# Patient Record
Sex: FEMALE | Race: BLACK OR AFRICAN AMERICAN | Employment: OTHER | URBAN - METROPOLITAN AREA
[De-identification: names, ages, dates, MRNs, and addresses within clinical notes are randomized per-mention and may not be internally consistent; named-entity substitution may affect disease eponyms.]

---

## 2021-08-15 ENCOUNTER — HOSPITAL ENCOUNTER (EMERGENCY)
Age: 76
Discharge: HOME | End: 2021-08-15
Attending: EMERGENCY MEDICINE
Payer: MEDICARE

## 2021-08-15 VITALS
DIASTOLIC BLOOD PRESSURE: 98 MMHG | HEART RATE: 67 BPM | TEMPERATURE: 97.3 F | RESPIRATION RATE: 14 BRPM | SYSTOLIC BLOOD PRESSURE: 149 MMHG | OXYGEN SATURATION: 99 %

## 2021-08-15 DIAGNOSIS — T14.8XXA MUSCLE STRAIN: Primary | ICD-10-CM

## 2021-08-15 PROCEDURE — 99283 EMERGENCY DEPT VISIT LOW MDM: CPT

## 2021-08-15 PROCEDURE — 6370000000 HC RX 637 (ALT 250 FOR IP): Performed by: STUDENT IN AN ORGANIZED HEALTH CARE EDUCATION/TRAINING PROGRAM

## 2021-08-15 RX ORDER — LIDOCAINE 4 G/G
1 PATCH TOPICAL DAILY
Status: DISCONTINUED | OUTPATIENT
Start: 2021-08-15 | End: 2021-08-15 | Stop reason: HOSPADM

## 2021-08-15 ASSESSMENT — PAIN DESCRIPTION - LOCATION
LOCATION: LEG
LOCATION: LEG

## 2021-08-15 ASSESSMENT — ENCOUNTER SYMPTOMS
CHEST TIGHTNESS: 0
SHORTNESS OF BREATH: 0
ABDOMINAL PAIN: 0
NAUSEA: 0
DIARRHEA: 0
VOMITING: 0

## 2021-08-15 ASSESSMENT — PAIN DESCRIPTION - PAIN TYPE
TYPE: ACUTE PAIN
TYPE: ACUTE PAIN

## 2021-08-15 ASSESSMENT — PAIN SCALES - GENERAL
PAINLEVEL_OUTOF10: 2
PAINLEVEL_OUTOF10: 8

## 2021-08-15 ASSESSMENT — PAIN - FUNCTIONAL ASSESSMENT
PAIN_FUNCTIONAL_ASSESSMENT: PREVENTS OR INTERFERES SOME ACTIVE ACTIVITIES AND ADLS
PAIN_FUNCTIONAL_ASSESSMENT: 0-10

## 2021-08-15 ASSESSMENT — PAIN DESCRIPTION - DESCRIPTORS: DESCRIPTORS: ACHING

## 2021-08-15 ASSESSMENT — PAIN DESCRIPTION - ORIENTATION
ORIENTATION: RIGHT
ORIENTATION: LEFT;UPPER

## 2021-08-15 ASSESSMENT — PAIN DESCRIPTION - PROGRESSION: CLINICAL_PROGRESSION: GRADUALLY IMPROVING

## 2021-08-15 ASSESSMENT — PAIN DESCRIPTION - ONSET: ONSET: SUDDEN

## 2021-08-15 ASSESSMENT — PAIN DESCRIPTION - FREQUENCY: FREQUENCY: CONTINUOUS

## 2021-08-15 NOTE — ED PROVIDER NOTES
[facility]          EM RESIDENT NOTE       Date of evaluation: 8/15/2021    Chief Complaint     Leg Pain (RIGHT inner thigh/groin pain since picking up heavy boxes yesterday.)      History of Present Illness     Minerva Johnson is a 68 y.o. female who presents with complaint of pain in her right inner thigh after lifting several heavy boxes. Patient states 3 days ago she was lifting a box of Ensure off of her porch when she felt a pull in her inner thigh. She says since this time she has had intermittent pain, attempted to take muscle relaxer at home that only slightly improved her pain. She was able to ambulate into the ED today without issue. She states that the pain is slightly worse when she walks, and better with rest.  She denies any other pain or injury. Review of Systems     Review of Systems   Constitutional: Negative for activity change, chills and fatigue. Respiratory: Negative for chest tightness and shortness of breath. Cardiovascular: Negative for chest pain and leg swelling. Gastrointestinal: Negative for abdominal pain, diarrhea, nausea and vomiting. Genitourinary: Negative for dysuria. Palpable mass on right inner thigh   Musculoskeletal: Negative for arthralgias. Neurological: Negative for dizziness, syncope, weakness and headaches. Past Medical, Surgical, Family, and Social History     She has a past medical history of Cancer of breast ([address] and Hypertension. She has a past surgical history that includes Appendectomy. Her family history is not on file. She reports that she is a non-smoker but has been exposed to tobacco smoke. She has never used smokeless tobacco. She reports that she does not drink alcohol and does not use drugs.     Medications     Previous Medications    FUROSEMIDE (LASIX PO)    Take by mouth    IBUPROFEN (ADVIL;MOTRIN) 600 MG TABLET    Take 1 tablet by mouth every 6 hours as needed for Pain LISINOPRIL PO    Take by mouth       Allergies     She has No Known Allergies. Physical Exam     INITIAL VITALS: BP: (!) 149/98, Temp: 97.3 °F (36.3 °C), Pulse: 67, Resp: 14, SpO2: 99 %   Physical Exam  Constitutional:       General: She is not in acute distress. Appearance: Normal appearance. She is normal weight. She is not ill-appearing. HENT:      Head: Normocephalic and atraumatic. Eyes:      Extraocular Movements: Extraocular movements intact. Conjunctiva/sclera: Conjunctivae normal.      Pupils: Pupils are equal, round, and reactive to light. Cardiovascular:      Rate and Rhythm: Normal rate and regular rhythm. Pulses: Normal pulses. Heart sounds: Normal heart sounds. Pulmonary:      Effort: Pulmonary effort is normal. No respiratory distress. Breath sounds: Normal breath sounds. Abdominal:      General: Abdomen is flat. Palpations: Abdomen is soft. Musculoskeletal:         General: No swelling. Normal range of motion. Cervical back: Normal range of motion and neck supple. Comments: Palpable mass in right inner thigh medial to femoral vessels. Skin:     General: Skin is warm and dry. Capillary Refill: Capillary refill takes less than 2 seconds. Neurological:      General: No focal deficit present. Mental Status: She is alert and oriented to person, place, and time. Mental status is at baseline. DiagnosticResults     EKG   None    RADIOLOGY:  No orders to display       LABS:   No results found for this visit on 08/15/21. ED BEDSIDE ULTRASOUND:  None    RECENT VITALS:  BP: (!) 149/98, Temp: 97.3 °F (36.3 °C), Pulse: 67,Resp: 14, SpO2: 99 %     Procedures     None    ED Course     Nursing Notes, Past Medical Hx, Past Surgical Hx, Social Hx, Allergies, and Family Hx were reviewed.     The patient was given the followingmedications:  Orders Placed This Encounter   Medications    lidocaine 4 % external patch 1 patch CONSULTS:  None    SIMEON Day Cresuad / ASSESSMENT / Josie Hawkins is a 68 y.o. female who presents to the ED with muscle strain of her right inner thigh muscle. The patient states that she was lifting a heavy box off of her porch when she felt something pull in her right inner thigh. For the past 2 to 3 days she has had she has had pain in this area. On initial examination the patient was well-appearing, afebrile, hemodynamically stable. Physical exam revealed palpable mass to right inner thigh medial to the femoral vessels. Bedside ultrasound was placed on this area, the femoral vessels were visualized, and area of pain was medial to these vessels. She was given a Lidoderm Patch and instructed for use in this area. She is also instructed to continue stretching, using warm compresses, and could use Tylenol over-the-counter as needed for pain. I instructed her that if the pain did not improve in 3 to 5 days she should follow-up with her primary care physician. I also instructed her to return to the ED for any new or worsening symptoms. Patient verbalized understanding of this and was agreeable to this plan. This patient was also evaluated by the attending physician. All care plans werediscussed and agreed upon. Clinical Impression     1. Muscle strain        Disposition     PATIENT REFERRED TO:  [facility]      As needed      DISCHARGE MEDICATIONS:  Discharge Medication List as of 8/15/2021 12:15 PM          DISPOSITION     -Discharge home, instructed patient on the use of stretching, warm compresses, Tylenol for pain. Instructed her that if pain did not improve within the next 3 to 5 days she should follow-up with her PCP. Return to the ED for any new or worsening symptoms.      Mike Campbell MD  Resident  08/15/21 5916

## 2021-08-15 NOTE — ED PROVIDER NOTES
ED Attending Attestation Note     Date of evaluation: 8/15/2021    This patient was seen by the resident. I have seen and examined the patient, agree with the workup, evaluation, management and diagnosis. The care plan has been discussed. My assessment reveals a palpable knot just distal to the right inguinal ligament that is medial to the femoral vessels.      Sandra Guillaume MD  08/15/21 8705

## 2021-08-15 NOTE — ED NOTES
Patient prepared for and ready to be discharged. Patient discharged at this time to home in care of self in no acute distress after verbalizing understanding of discharge instructions. Patient left after receiving After Visit Summary instructions.         Sarah Gonzalez RN  08/15/21 1249

## 2023-12-24 ENCOUNTER — APPOINTMENT (OUTPATIENT)
Dept: GENERAL RADIOLOGY | Age: 78
DRG: 040 | End: 2023-12-24
Payer: MEDICARE

## 2023-12-24 ENCOUNTER — HOSPITAL ENCOUNTER (INPATIENT)
Age: 78
LOS: 4 days | Discharge: NURSING, REHAB OR LONG-TERM CARE | DRG: 040 | End: 2023-12-28
Attending: STUDENT IN AN ORGANIZED HEALTH CARE EDUCATION/TRAINING PROGRAM | Admitting: STUDENT IN AN ORGANIZED HEALTH CARE EDUCATION/TRAINING PROGRAM
Payer: MEDICARE

## 2023-12-24 ENCOUNTER — APPOINTMENT (OUTPATIENT)
Dept: MRI IMAGING | Age: 78
DRG: 040 | End: 2023-12-24
Payer: MEDICARE

## 2023-12-24 ENCOUNTER — APPOINTMENT (OUTPATIENT)
Dept: CT IMAGING | Age: 78
DRG: 040 | End: 2023-12-24
Payer: MEDICARE

## 2023-12-24 DIAGNOSIS — R41.82 ALTERED MENTAL STATUS, UNSPECIFIED ALTERED MENTAL STATUS TYPE: Primary | ICD-10-CM

## 2023-12-24 DIAGNOSIS — I63.512 ACUTE ISCHEMIC LEFT MCA STROKE (HCC): ICD-10-CM

## 2023-12-24 PROBLEM — I63.9 ACUTE CVA (CEREBROVASCULAR ACCIDENT) (HCC): Status: ACTIVE | Noted: 2023-12-24

## 2023-12-24 LAB
AMORPH SED URNS QL MICRO: ABNORMAL /HPF
ANION GAP SERPL CALCULATED.3IONS-SCNC: 6 MMOL/L (ref 3–16)
BACTERIA URNS QL MICRO: ABNORMAL /HPF
BASOPHILS # BLD: 0.1 K/UL (ref 0–0.2)
BASOPHILS NFR BLD: 1.4 %
BILIRUB UR QL STRIP.AUTO: NEGATIVE
BUN SERPL-MCNC: 12 MG/DL (ref 7–20)
CALCIUM SERPL-MCNC: 9.7 MG/DL (ref 8.3–10.6)
CHLORIDE SERPL-SCNC: 99 MMOL/L (ref 99–110)
CLARITY UR: CLEAR
CO2 SERPL-SCNC: 33 MMOL/L (ref 21–32)
COLOR UR: YELLOW
CREAT SERPL-MCNC: 0.7 MG/DL (ref 0.6–1.2)
DEPRECATED RDW RBC AUTO: 13.9 % (ref 12.4–15.4)
EOSINOPHIL # BLD: 0 K/UL (ref 0–0.6)
EOSINOPHIL NFR BLD: 0.8 %
EPI CELLS #/AREA URNS HPF: ABNORMAL /HPF (ref 0–5)
GFR SERPLBLD CREATININE-BSD FMLA CKD-EPI: >60 ML/MIN/{1.73_M2}
GLUCOSE SERPL-MCNC: 93 MG/DL (ref 70–99)
GLUCOSE UR STRIP.AUTO-MCNC: NEGATIVE MG/DL
HCT VFR BLD AUTO: 37.4 % (ref 36–48)
HGB BLD-MCNC: 12.6 G/DL (ref 12–16)
HGB UR QL STRIP.AUTO: NEGATIVE
KETONES UR STRIP.AUTO-MCNC: 15 MG/DL
LEUKOCYTE ESTERASE UR QL STRIP.AUTO: ABNORMAL
LYMPHOCYTES # BLD: 1 K/UL (ref 1–5.1)
LYMPHOCYTES NFR BLD: 19.2 %
MCH RBC QN AUTO: 28.8 PG (ref 26–34)
MCHC RBC AUTO-ENTMCNC: 33.6 G/DL (ref 31–36)
MCV RBC AUTO: 85.7 FL (ref 80–100)
MONOCYTES # BLD: 0.5 K/UL (ref 0–1.3)
MONOCYTES NFR BLD: 9.8 %
NEUTROPHILS # BLD: 3.5 K/UL (ref 1.7–7.7)
NEUTROPHILS NFR BLD: 68.8 %
NITRITE UR QL STRIP.AUTO: NEGATIVE
PH UR STRIP.AUTO: 6.5 [PH] (ref 5–8)
PLATELET # BLD AUTO: 148 K/UL (ref 135–450)
PMV BLD AUTO: 9 FL (ref 5–10.5)
POTASSIUM SERPL-SCNC: 3.7 MMOL/L (ref 3.5–5.1)
PROT UR STRIP.AUTO-MCNC: NEGATIVE MG/DL
RBC # BLD AUTO: 4.36 M/UL (ref 4–5.2)
RBC #/AREA URNS HPF: ABNORMAL /HPF (ref 0–4)
SODIUM SERPL-SCNC: 138 MMOL/L (ref 136–145)
SP GR UR STRIP.AUTO: 1.02 (ref 1–1.03)
UA DIPSTICK W REFLEX MICRO PNL UR: YES
URN SPEC COLLECT METH UR: ABNORMAL
UROBILINOGEN UR STRIP-ACNC: 2 E.U./DL
WBC # BLD AUTO: 5.1 K/UL (ref 4–11)
WBC #/AREA URNS HPF: ABNORMAL /HPF (ref 0–5)

## 2023-12-24 PROCEDURE — 6360000004 HC RX CONTRAST MEDICATION: Performed by: STUDENT IN AN ORGANIZED HEALTH CARE EDUCATION/TRAINING PROGRAM

## 2023-12-24 PROCEDURE — 93005 ELECTROCARDIOGRAM TRACING: CPT

## 2023-12-24 PROCEDURE — 71046 X-RAY EXAM CHEST 2 VIEWS: CPT

## 2023-12-24 PROCEDURE — 70551 MRI BRAIN STEM W/O DYE: CPT

## 2023-12-24 PROCEDURE — 70450 CT HEAD/BRAIN W/O DYE: CPT

## 2023-12-24 PROCEDURE — 4A03X5D MEASUREMENT OF ARTERIAL FLOW, INTRACRANIAL, EXTERNAL APPROACH: ICD-10-PCS | Performed by: RADIOLOGY

## 2023-12-24 PROCEDURE — 81001 URINALYSIS AUTO W/SCOPE: CPT

## 2023-12-24 PROCEDURE — 80048 BASIC METABOLIC PNL TOTAL CA: CPT

## 2023-12-24 PROCEDURE — 99285 EMERGENCY DEPT VISIT HI MDM: CPT

## 2023-12-24 PROCEDURE — 70498 CT ANGIOGRAPHY NECK: CPT

## 2023-12-24 PROCEDURE — 85025 COMPLETE CBC W/AUTO DIFF WBC: CPT

## 2023-12-24 PROCEDURE — 1200000000 HC SEMI PRIVATE

## 2023-12-24 RX ORDER — ASPIRIN 81 MG/1
81 TABLET, CHEWABLE ORAL DAILY
Status: DISCONTINUED | OUTPATIENT
Start: 2023-12-25 | End: 2023-12-28 | Stop reason: HOSPADM

## 2023-12-24 RX ORDER — LOSARTAN POTASSIUM 25 MG/1
25 TABLET ORAL DAILY
Status: DISCONTINUED | OUTPATIENT
Start: 2023-12-25 | End: 2023-12-28 | Stop reason: HOSPADM

## 2023-12-24 RX ORDER — ONDANSETRON 4 MG/1
4 TABLET, ORALLY DISINTEGRATING ORAL EVERY 8 HOURS PRN
Status: DISCONTINUED | OUTPATIENT
Start: 2023-12-24 | End: 2023-12-28 | Stop reason: HOSPADM

## 2023-12-24 RX ORDER — SODIUM CHLORIDE 0.9 % (FLUSH) 0.9 %
5-40 SYRINGE (ML) INJECTION PRN
Status: DISCONTINUED | OUTPATIENT
Start: 2023-12-24 | End: 2023-12-28 | Stop reason: HOSPADM

## 2023-12-24 RX ORDER — SODIUM CHLORIDE 9 MG/ML
INJECTION, SOLUTION INTRAVENOUS PRN
Status: DISCONTINUED | OUTPATIENT
Start: 2023-12-24 | End: 2023-12-28 | Stop reason: HOSPADM

## 2023-12-24 RX ORDER — ASPIRIN 300 MG/1
300 SUPPOSITORY RECTAL DAILY
Status: DISCONTINUED | OUTPATIENT
Start: 2023-12-25 | End: 2023-12-28 | Stop reason: HOSPADM

## 2023-12-24 RX ORDER — POLYETHYLENE GLYCOL 3350 17 G/17G
17 POWDER, FOR SOLUTION ORAL DAILY PRN
Status: DISCONTINUED | OUTPATIENT
Start: 2023-12-24 | End: 2023-12-28 | Stop reason: HOSPADM

## 2023-12-24 RX ORDER — ENOXAPARIN SODIUM 100 MG/ML
40 INJECTION SUBCUTANEOUS DAILY
Status: DISCONTINUED | OUTPATIENT
Start: 2023-12-25 | End: 2023-12-28 | Stop reason: HOSPADM

## 2023-12-24 RX ORDER — CARVEDILOL 25 MG/1
25 TABLET ORAL 2 TIMES DAILY WITH MEALS
Status: DISCONTINUED | OUTPATIENT
Start: 2023-12-25 | End: 2023-12-28 | Stop reason: HOSPADM

## 2023-12-24 RX ORDER — ATORVASTATIN CALCIUM 80 MG/1
80 TABLET, FILM COATED ORAL NIGHTLY
Status: DISCONTINUED | OUTPATIENT
Start: 2023-12-24 | End: 2023-12-28 | Stop reason: HOSPADM

## 2023-12-24 RX ORDER — SODIUM CHLORIDE 0.9 % (FLUSH) 0.9 %
5-40 SYRINGE (ML) INJECTION EVERY 12 HOURS SCHEDULED
Status: DISCONTINUED | OUTPATIENT
Start: 2023-12-24 | End: 2023-12-28 | Stop reason: HOSPADM

## 2023-12-24 RX ORDER — LABETALOL HYDROCHLORIDE 5 MG/ML
10 INJECTION, SOLUTION INTRAVENOUS EVERY 6 HOURS PRN
Status: DISCONTINUED | OUTPATIENT
Start: 2023-12-24 | End: 2023-12-28 | Stop reason: HOSPADM

## 2023-12-24 RX ORDER — ONDANSETRON 2 MG/ML
4 INJECTION INTRAMUSCULAR; INTRAVENOUS EVERY 6 HOURS PRN
Status: DISCONTINUED | OUTPATIENT
Start: 2023-12-24 | End: 2023-12-28 | Stop reason: HOSPADM

## 2023-12-24 RX ORDER — CARVEDILOL 25 MG/1
25 TABLET ORAL 2 TIMES DAILY WITH MEALS
Status: ON HOLD | COMMUNITY
Start: 2023-01-17

## 2023-12-24 RX ORDER — ESCITALOPRAM OXALATE 5 MG/1
5 TABLET ORAL DAILY
Status: ON HOLD | COMMUNITY
Start: 2023-12-04

## 2023-12-24 RX ORDER — TELMISARTAN 80 MG/1
80 TABLET ORAL DAILY
Status: ON HOLD | COMMUNITY
Start: 2023-01-17

## 2023-12-24 RX ADMIN — IOPAMIDOL 75 ML: 755 INJECTION, SOLUTION INTRAVENOUS at 17:00
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
## 2023-12-24 NOTE — ED PROVIDER NOTES
ED Attending Attestation Note     Date of evaluation: 12/24/2023    I have discussed the case with the resident. I have personally performed a history, physical exam, and my own medical decision making. I have reviewed the note and agree with the findings and plan. I have reviewed the ECG and concur with the resident's interpretation. My assessment reveals a well-appearing 66-year-old female with past medical history of CHF, hypertension, and TIFFANI presenting with progressive confusion. Per the patient, she has no complaints and does not feel confused. Per her family, over the last week and a half the patient has been more confused and forgetful. The example they gave is that this morning she woke up and thought it was a different day and that she had to go to work when it is really the weekend. The patient's workup is reassuring today, she is hemodynamically stable, afebrile, and her labs do not show any gross electrolyte abnormality, leukocytosis, or anemia. Her urine does not show any signs of infection. During patient's visit today, she was unable to ambulate without assistance which is new in the last week. She also had an episode of urinating on the floor prior to being able to sit on the toilet which is also no. Given this new gait instability and urinary incontinence, MRI of the brain was obtained. MRI concerning for acute right MCA stroke. Radiology believes within the last 24 hours. This is not consistent with the patient's weeklong symptoms. No clear acute change today which would correspond with the acute stroke.  stroke team consulted who agree that patient is likely not a candidate for thrombectomy or TNK. CTA of the head and neck obtained and placed and will be admitted for further evaluation and neurology consultation.        Vimal Stewart MD  12/24/23 5607
versus hemorrhage. Recommend CT head without contrast and CTA head and   neck with contrast for further evaluation. XR CHEST (2 VW)   Final Result   No acute cardiopulmonary abnormality.           LABS:   Results for orders placed or performed during the hospital encounter of 12/24/23   Urinalysis with Microscopic   Result Value Ref Range    Color, UA Yellow Straw/Yellow    Clarity, UA Clear Clear    Glucose, Ur Negative Negative mg/dL    Bilirubin Urine Negative Negative    Ketones, Urine 15 (A) Negative mg/dL    Specific Gravity, UA 1.020 1.005 - 1.030    Blood, Urine Negative Negative    pH, UA 6.5 5.0 - 8.0    Protein, UA Negative Negative mg/dL    Urobilinogen, Urine 2.0 (A) <2.0 E.U./dL    Nitrite, Urine Negative Negative    Leukocyte Esterase, Urine TRACE (A) Negative    Microscopic Examination YES     Urine Type NotGiven     WBC, UA 3-5 0 - 5 /HPF    RBC, UA 3-4 0 - 4 /HPF    Epithelial Cells, UA 2-5 0 - 5 /HPF    Bacteria, UA 1+ (A) None Seen /HPF    Amorphous, UA 1+ /HPF   CBC with Auto Differential   Result Value Ref Range    WBC 5.1 4.0 - 11.0 K/uL    RBC 4.36 4.00 - 5.20 M/uL    Hemoglobin 12.6 12.0 - 16.0 g/dL    Hematocrit 37.4 36.0 - 48.0 %    MCV 85.7 80.0 - 100.0 fL    MCH 28.8 26.0 - 34.0 pg    MCHC 33.6 31.0 - 36.0 g/dL    RDW 13.9 12.4 - 15.4 %    Platelets 601 013 - 008 K/uL    MPV 9.0 5.0 - 10.5 fL    Neutrophils % 68.8 %    Lymphocytes % 19.2 %    Monocytes % 9.8 %    Eosinophils % 0.8 %    Basophils % 1.4 %    Neutrophils Absolute 3.5 1.7 - 7.7 K/uL    Lymphocytes Absolute 1.0 1.0 - 5.1 K/uL    Monocytes Absolute 0.5 0.0 - 1.3 K/uL    Eosinophils Absolute 0.0 0.0 - 0.6 K/uL    Basophils Absolute 0.1 0.0 - 0.2 K/uL   Basic Metabolic Panel w/ Reflex to MG   Result Value Ref Range    Sodium 138 136 - 145 mmol/L    Potassium reflex Magnesium 3.7 3.5 - 5.1 mmol/L    Chloride 99 99 - 110 mmol/L    CO2 33 (H) 21 - 32 mmol/L    Anion Gap 6 3 - 16    Glucose 93 70 - 99 mg/dL    BUN 12 7 - 20

## 2023-12-25 PROBLEM — I63.411 CEREBROVASCULAR ACCIDENT (CVA) DUE TO EMBOLISM OF RIGHT MIDDLE CEREBRAL ARTERY (HCC): Status: ACTIVE | Noted: 2023-12-24

## 2023-12-25 LAB
ANION GAP SERPL CALCULATED.3IONS-SCNC: 8 MMOL/L (ref 3–16)
BUN SERPL-MCNC: 13 MG/DL (ref 7–20)
CALCIUM SERPL-MCNC: 9.5 MG/DL (ref 8.3–10.6)
CHLORIDE SERPL-SCNC: 101 MMOL/L (ref 99–110)
CO2 SERPL-SCNC: 31 MMOL/L (ref 21–32)
CREAT SERPL-MCNC: 0.8 MG/DL (ref 0.6–1.2)
DEPRECATED RDW RBC AUTO: 14.2 % (ref 12.4–15.4)
EKG ATRIAL RATE: 69 BPM
EKG DIAGNOSIS: NORMAL
EKG P AXIS: 65 DEGREES
EKG P-R INTERVAL: 148 MS
EKG Q-T INTERVAL: 388 MS
EKG QRS DURATION: 86 MS
EKG QTC CALCULATION (BAZETT): 415 MS
EKG R AXIS: 14 DEGREES
EKG T AXIS: 2 DEGREES
EKG VENTRICULAR RATE: 69 BPM
GFR SERPLBLD CREATININE-BSD FMLA CKD-EPI: >60 ML/MIN/{1.73_M2}
GLUCOSE SERPL-MCNC: 92 MG/DL (ref 70–99)
HCT VFR BLD AUTO: 35.4 % (ref 36–48)
HGB BLD-MCNC: 12 G/DL (ref 12–16)
MCH RBC QN AUTO: 28.8 PG (ref 26–34)
MCHC RBC AUTO-ENTMCNC: 33.9 G/DL (ref 31–36)
MCV RBC AUTO: 85 FL (ref 80–100)
PLATELET # BLD AUTO: 147 K/UL (ref 135–450)
PMV BLD AUTO: 8.5 FL (ref 5–10.5)
POTASSIUM SERPL-SCNC: 3.6 MMOL/L (ref 3.5–5.1)
RBC # BLD AUTO: 4.17 M/UL (ref 4–5.2)
SODIUM SERPL-SCNC: 140 MMOL/L (ref 136–145)
WBC # BLD AUTO: 4.8 K/UL (ref 4–11)

## 2023-12-25 PROCEDURE — 80048 BASIC METABOLIC PNL TOTAL CA: CPT

## 2023-12-25 PROCEDURE — 36415 COLL VENOUS BLD VENIPUNCTURE: CPT

## 2023-12-25 PROCEDURE — 97166 OT EVAL MOD COMPLEX 45 MIN: CPT

## 2023-12-25 PROCEDURE — 97535 SELF CARE MNGMENT TRAINING: CPT

## 2023-12-25 PROCEDURE — 97530 THERAPEUTIC ACTIVITIES: CPT

## 2023-12-25 PROCEDURE — 80061 LIPID PANEL: CPT

## 2023-12-25 PROCEDURE — 83036 HEMOGLOBIN GLYCOSYLATED A1C: CPT

## 2023-12-25 PROCEDURE — 85027 COMPLETE CBC AUTOMATED: CPT

## 2023-12-25 PROCEDURE — 2580000003 HC RX 258: Performed by: STUDENT IN AN ORGANIZED HEALTH CARE EDUCATION/TRAINING PROGRAM

## 2023-12-25 PROCEDURE — 99223 1ST HOSP IP/OBS HIGH 75: CPT | Performed by: PSYCHIATRY & NEUROLOGY

## 2023-12-25 PROCEDURE — 6360000002 HC RX W HCPCS: Performed by: STUDENT IN AN ORGANIZED HEALTH CARE EDUCATION/TRAINING PROGRAM

## 2023-12-25 PROCEDURE — 6370000000 HC RX 637 (ALT 250 FOR IP): Performed by: STUDENT IN AN ORGANIZED HEALTH CARE EDUCATION/TRAINING PROGRAM

## 2023-12-25 PROCEDURE — 1200000000 HC SEMI PRIVATE

## 2023-12-25 RX ADMIN — SODIUM CHLORIDE, PRESERVATIVE FREE 10 ML: 5 INJECTION INTRAVENOUS at 04:58

## 2023-12-25 RX ADMIN — LOSARTAN POTASSIUM 25 MG: 25 TABLET, FILM COATED ORAL at 09:40

## 2023-12-25 RX ADMIN — ENOXAPARIN SODIUM 40 MG: 100 INJECTION SUBCUTANEOUS at 09:40

## 2023-12-25 RX ADMIN — SODIUM CHLORIDE, PRESERVATIVE FREE 10 ML: 5 INJECTION INTRAVENOUS at 21:29

## 2023-12-25 RX ADMIN — ASPIRIN 81 MG: 81 TABLET, CHEWABLE ORAL at 09:40

## 2023-12-25 RX ADMIN — SODIUM CHLORIDE, PRESERVATIVE FREE 10 ML: 5 INJECTION INTRAVENOUS at 09:40

## 2023-12-25 RX ADMIN — CARVEDILOL 25 MG: 25 TABLET, FILM COATED ORAL at 09:40

## 2023-12-25 RX ADMIN — ATORVASTATIN CALCIUM 80 MG: 80 TABLET, FILM COATED ORAL at 21:25

## 2023-12-25 NOTE — H&P
WITH CONTRAST 2.  CT ANGIOGRAPHY NECK WITH CONTRAST INDICATION: acute infarct COMPARISON: CT head without contrast from the same day TECHNIQUE: CT angiogram of the head and neck was performed with contrast according to standard protocol. Axial images, multiplanar reformatted images, and maximum intensity projection images were reviewed for CT angiographic technique. Up-to-date CT equipment and radiation dose reduction techniques were employed. Imaging analyzed by the [employer] software. IV contrast: 80 mL Isovue 370 FINDINGS: Non-angiographic findings: The thyroid gland is multinodular. CTA Neck: The visualized aortic arch appears normal. The configuration of the brachiocephalic vessels is typical. The innominate artery and both subclavian arteries appear normal. The common carotid arteries and bilateral carotid bifurcations appear normal with no stenosis by NASCET criteria. The cervical internal carotid arteries appear normal. The cervical vertebral arteries appear normal. CTA Head: There is atherosclerotic disease involving the distal internal carotid arteries without significant focal stenosis. The anterior cerebral arteries the left cerebral artery appears normal. Right middle cerebral artery M1 and M2 segments are patent. There is suspected occlusion of a right MCA posterior branch at the M2-M3 junction (axial series 2 image 420). There is mild focal stenosis of the distal left vertebral artery V4 segment. The basilar artery and posterior cerebral arteries appear normal. There is a fetal origin right posterior cerebral artery. Dural venous sinuses are patent. 1.  Suspected occlusion of a right middle cerebral artery posterior M2-M3 junction. 2.  No significant stenosis in the extracranial carotid arteries. Moderate focal stenosis of the distal left vertebral artery V4 segment.      CT HEAD WO CONTRAST    Result Date: 12/24/2023  PROCEDURE: CT head without contrast INDICATION: stroke; COMPARISON: MRI from the

## 2023-12-25 NOTE — NURSE NAVIGATOR
Patients personal risk factors specific to stroke/TIA include: hypertension    Patient's chart reviewed for Stroke Core Measures and additional needs:    []   VTE prophylaxis - Lovenox ordered for 12/25 0900. []   Antithrombotic (if applicable) - aspirin ordered for 12/25 0900   [x]   Swallow screen prior to PO intake - pass   [x]   Lipids / A1C ordered    [x]   Therapy ordered    [x]   Care plan and Education template     Navigator to continue to follow patient while admitted, to assist with follow up and discharge planning as needed.      Nurse eSignature: Electronically signed by Jordan Griffith RN on 12/24/23 at 10:28 PM EST - Neuroscience Navigator

## 2023-12-25 NOTE — PLAN OF CARE
Problem: Discharge Planning  Goal: Discharge to home or other facility with appropriate resources  Outcome: Progressing     Problem: Neurosensory - Adult  Goal: Achieves stable or improved neurological status  Outcome: Progressing  Goal: Achieves maximal functionality and self care  Outcome: Progressing     Problem: Safety - Adult  Goal: Free from fall injury  Outcome: Progressing

## 2023-12-25 NOTE — PLAN OF CARE
Problem: Neurosensory - Adult  Goal: Achieves stable or improved neurological status  Outcome: Progressing   NIH 4, L neglect and L hemianopia noted. Cues given when pt oob x1 walker to assist with ambulation and ADLs  Problem: Safety - Adult  Goal: Free from fall injury  Outcome: Progressing   Pt free from injury this shift and free of falls. 2/4 rails up on bed and bed is in the lowest position. Wheels locked and bed alarm set. Socks on pt and ID bands on pt. Call light in reach of pt and pt educated to call out to get up x1 walker gb. Will continue to monitor for safety.   Avasys on

## 2023-12-26 LAB
CHOLEST SERPL-MCNC: 169 MG/DL (ref 0–199)
EST. AVERAGE GLUCOSE BLD GHB EST-MCNC: 102.5 MG/DL
HBA1C MFR BLD: 5.2 %
HDLC SERPL-MCNC: 47 MG/DL (ref 40–60)
LDLC SERPL CALC-MCNC: 110 MG/DL
TRIGL SERPL-MCNC: 59 MG/DL (ref 0–150)
VLDLC SERPL CALC-MCNC: 12 MG/DL

## 2023-12-26 PROCEDURE — 97530 THERAPEUTIC ACTIVITIES: CPT

## 2023-12-26 PROCEDURE — 2060000000 HC ICU INTERMEDIATE R&B

## 2023-12-26 PROCEDURE — 97162 PT EVAL MOD COMPLEX 30 MIN: CPT

## 2023-12-26 PROCEDURE — 97116 GAIT TRAINING THERAPY: CPT

## 2023-12-26 PROCEDURE — 2580000003 HC RX 258: Performed by: STUDENT IN AN ORGANIZED HEALTH CARE EDUCATION/TRAINING PROGRAM

## 2023-12-26 PROCEDURE — 6370000000 HC RX 637 (ALT 250 FOR IP): Performed by: STUDENT IN AN ORGANIZED HEALTH CARE EDUCATION/TRAINING PROGRAM

## 2023-12-26 PROCEDURE — 6360000002 HC RX W HCPCS: Performed by: STUDENT IN AN ORGANIZED HEALTH CARE EDUCATION/TRAINING PROGRAM

## 2023-12-26 RX ADMIN — SODIUM CHLORIDE, PRESERVATIVE FREE 10 ML: 5 INJECTION INTRAVENOUS at 21:26

## 2023-12-26 RX ADMIN — SODIUM CHLORIDE, PRESERVATIVE FREE 10 ML: 5 INJECTION INTRAVENOUS at 11:02

## 2023-12-26 RX ADMIN — ASPIRIN 81 MG: 81 TABLET, CHEWABLE ORAL at 11:02

## 2023-12-26 RX ADMIN — LOSARTAN POTASSIUM 25 MG: 25 TABLET, FILM COATED ORAL at 11:02

## 2023-12-26 RX ADMIN — CARVEDILOL 25 MG: 25 TABLET, FILM COATED ORAL at 11:02

## 2023-12-26 RX ADMIN — CARVEDILOL 25 MG: 25 TABLET, FILM COATED ORAL at 16:27

## 2023-12-26 RX ADMIN — ATORVASTATIN CALCIUM 80 MG: 80 TABLET, FILM COATED ORAL at 21:26

## 2023-12-26 RX ADMIN — ENOXAPARIN SODIUM 40 MG: 100 INJECTION SUBCUTANEOUS at 11:02

## 2023-12-26 NOTE — PLAN OF CARE
Problem: Neurosensory - Adult  Goal: Achieves stable or improved neurological status  Outcome: Progressing     Problem: Safety - Adult  Goal: Free from fall injury  12/26/2023 0753 by Belen Dominguez RN  Outcome: Progressing

## 2023-12-26 NOTE — PLAN OF CARE
Problem: Safety - Adult  Goal: Free from fall injury  12/26/2023 0301 by Richard Bailey RN  Outcome: Progressing   Pt has been free from falls this shift, bed alarm on, bed in lowest position, 2/4 side rails up, nonskid socks on, wheels locked, bedside table and call light in reach. Encouraged pt to call out if needed anything. Problem: Pain  Goal: Verbalizes/displays adequate comfort level or baseline comfort level  Outcome: Progressing   Pt denies pain at this time, RN encouraged pt to call out if needed anything. Will continue to assess pain level throughout shift.

## 2023-12-27 ENCOUNTER — NURSE ONLY (OUTPATIENT)
Dept: CARDIOLOGY CLINIC | Age: 78
End: 2023-12-27

## 2023-12-27 PROBLEM — R41.82 ALTERED MENTAL STATUS: Status: ACTIVE | Noted: 2023-12-27

## 2023-12-27 PROCEDURE — 33285 INSJ SUBQ CAR RHYTHM MNTR: CPT

## 2023-12-27 PROCEDURE — 2060000000 HC ICU INTERMEDIATE R&B

## 2023-12-27 PROCEDURE — C8929 TTE W OR WO FOL WCON,DOPPLER: HCPCS

## 2023-12-27 PROCEDURE — 6360000002 HC RX W HCPCS: Performed by: STUDENT IN AN ORGANIZED HEALTH CARE EDUCATION/TRAINING PROGRAM

## 2023-12-27 PROCEDURE — 0JH602Z INSERTION OF MONITORING DEVICE INTO CHEST SUBCUTANEOUS TISSUE AND FASCIA, OPEN APPROACH: ICD-10-PCS | Performed by: INTERNAL MEDICINE

## 2023-12-27 PROCEDURE — 6370000000 HC RX 637 (ALT 250 FOR IP): Performed by: STUDENT IN AN ORGANIZED HEALTH CARE EDUCATION/TRAINING PROGRAM

## 2023-12-27 PROCEDURE — 2580000003 HC RX 258: Performed by: STUDENT IN AN ORGANIZED HEALTH CARE EDUCATION/TRAINING PROGRAM

## 2023-12-27 PROCEDURE — 99223 1ST HOSP IP/OBS HIGH 75: CPT | Performed by: INTERNAL MEDICINE

## 2023-12-27 PROCEDURE — 33285 INSJ SUBQ CAR RHYTHM MNTR: CPT | Performed by: INTERNAL MEDICINE

## 2023-12-27 PROCEDURE — C1764 EVENT RECORDER, CARDIAC: HCPCS | Performed by: INTERNAL MEDICINE

## 2023-12-27 PROCEDURE — 97530 THERAPEUTIC ACTIVITIES: CPT

## 2023-12-27 PROCEDURE — 93356 MYOCRD STRAIN IMG SPCKL TRCK: CPT

## 2023-12-27 PROCEDURE — 99232 SBSQ HOSP IP/OBS MODERATE 35: CPT

## 2023-12-27 RX ADMIN — SODIUM CHLORIDE, PRESERVATIVE FREE 10 ML: 5 INJECTION INTRAVENOUS at 20:39

## 2023-12-27 RX ADMIN — LOSARTAN POTASSIUM 25 MG: 25 TABLET, FILM COATED ORAL at 09:13

## 2023-12-27 RX ADMIN — ATORVASTATIN CALCIUM 80 MG: 80 TABLET, FILM COATED ORAL at 20:39

## 2023-12-27 RX ADMIN — SODIUM CHLORIDE, PRESERVATIVE FREE 10 ML: 5 INJECTION INTRAVENOUS at 09:14

## 2023-12-27 RX ADMIN — CARVEDILOL 25 MG: 25 TABLET, FILM COATED ORAL at 09:13

## 2023-12-27 RX ADMIN — ENOXAPARIN SODIUM 40 MG: 100 INJECTION SUBCUTANEOUS at 09:14

## 2023-12-27 RX ADMIN — ASPIRIN 81 MG: 81 TABLET, CHEWABLE ORAL at 09:13

## 2023-12-27 NOTE — DISCHARGE INSTRUCTIONS
(even microwaves). You should not lean against them while they are on. When using cellular and cordless phones, keep them at least 6 inches away from your device. (Use on the opposite side of your device.)  Do not hold or carry strong magnets. You may NOT perform welding. Airport and security screening devices should be avoided. You will need to show your ID card and ask for a hand search. Always tell your health care professional (including the dentist) that you have a device and show your ID card. Follow Up Care: The loop recorder DOES NOT replace your current medications. It is important for you to continue your medications as prescribed. You will be given your first follow up appointment approximately one week after your procedure to check your wound and device. If you are unsure of your follow up appointment please call the office. FOLLOW-UP APPOINTMENTS    Follow-up with device technician,  in 7-10 days for a wound and device check. [address] [place] [address] Phone: [phone]. If you are unable to make this appointment, please call to reschedule.

## 2023-12-27 NOTE — PLAN OF CARE
Problem: Discharge Planning  Goal: Discharge to home or other facility with appropriate resources  12/27/2023 0945 by Clifford Nieves RN  Outcome: Progressing  Patient working towards goals for discharge. Problem: Safety - Adult  Goal: Free from fall injury  12/27/2023 0945 by Clifford Nieves RN  Outcome: Progressing  Patient has all standard fall precautions in place. Problem: Chronic Conditions and Co-morbidities  Goal: Patient's chronic conditions and co-morbidity symptoms are monitored and maintained or improved  12/27/2023 0945 by Clifford Nieves RN  Outcome: Progressing   Patient remains stable at this time.

## 2023-12-27 NOTE — PROCEDURES
[facility]     Electrophysiology Procedure Note       Date of Procedure: 12/27/2023  Patient's Name: Tanya Disla  YOB: 1945   Medical Record Number: [id number]  Procedure Performed by: Nicole Moreira MD.,    Procedures performed:    Loop recorder implantation    Indication of the procedure: Syncope, Palpitation   Tanya Disla is a 66 y.o. female with cerebrovascular accident. Because the patient is at risk of having atrial dysrhythmia, particularly atrial fibrillation, the decision was made to undergo a procedure that would afford long term rhythm detection. Therefore, the patient has been scheduled to undergo an ILR implantation. Details of procedure:   Procedure's risks, benefits and alternatives of procedure were explained to patient. All questions were answered. Patient understood and informed consent was obtained. The patient was brought to the holding bay in a fasting nonsedated state. Patient was prepped and draped in usual sterile fashion. No moderate sedation (conscious sedation) nor general anesthesia was administered or utilized for this procedure. The patient was monitored continuously with ECG, pulse oximetry, blood pressure monitoring, and direct observation. After injection of 0.5% bupivacaine/lidocaine mixture in the left 4th intercostal space, a 5 mm small incision was made using the [employer]-provided scalpel, after which a #11 blade was used to expand the opening of this incision on both ends. Then a [employer]-provided tunneling tool was inserted into this scar in a diagonal trajectory towards the L nipple which created the necessary space to insert the implantable loop recorder. This tool was used to deliver the implantable loop recorder into the created space. Both the plunger and the tunneling tool was then retracted. The surgical scar was taped with Steri-strips and manual pressure was held over the taped area to achieve hemostasis.      The patient

## 2023-12-27 NOTE — PLAN OF CARE
Problem: Discharge Planning  Goal: Discharge to home or other facility with appropriate resources  Outcome: Progressing     Problem: Neurosensory - Adult  Goal: Achieves stable or improved neurological status  Outcome: Progressing     Problem: Safety - Adult  Goal: Free from fall injury  Outcome: Progressing     Problem: Pain  Goal: Verbalizes/displays adequate comfort level or baseline comfort level  Outcome: Progressing     Problem: ABCDS Injury Assessment  Goal: Absence of physical injury  Outcome: Progressing     Problem: Chronic Conditions and Co-morbidities  Goal: Patient's chronic conditions and co-morbidity symptoms are monitored and maintained or improved  Outcome: Progressing

## 2023-12-28 ENCOUNTER — HOSPITAL ENCOUNTER (INPATIENT)
Age: 78
DRG: 057 | End: 2023-12-28
Attending: PHYSICAL MEDICINE & REHABILITATION | Admitting: PHYSICAL MEDICINE & REHABILITATION
Payer: MEDICARE

## 2023-12-28 VITALS
WEIGHT: 114.6 LBS | DIASTOLIC BLOOD PRESSURE: 64 MMHG | RESPIRATION RATE: 16 BRPM | HEART RATE: 65 BPM | TEMPERATURE: 98.1 F | SYSTOLIC BLOOD PRESSURE: 118 MMHG | OXYGEN SATURATION: 98 % | BODY MASS INDEX: 18.42 KG/M2 | HEIGHT: 66 IN

## 2023-12-28 PROBLEM — I63.9 ACUTE CEREBROVASCULAR ACCIDENT (CVA) (HCC): Status: ACTIVE | Noted: 2023-12-28

## 2023-12-28 PROBLEM — Z45.09 ENCOUNTER FOR LOOP RECORDER CHECK: Status: ACTIVE | Noted: 2023-12-28

## 2023-12-28 LAB
ANION GAP SERPL CALCULATED.3IONS-SCNC: 8 MMOL/L (ref 3–16)
BASOPHILS # BLD: 0 K/UL (ref 0–0.2)
BASOPHILS NFR BLD: 0.5 %
BUN SERPL-MCNC: 12 MG/DL (ref 7–20)
CALCIUM SERPL-MCNC: 9.5 MG/DL (ref 8.3–10.6)
CHLORIDE SERPL-SCNC: 103 MMOL/L (ref 99–110)
CO2 SERPL-SCNC: 30 MMOL/L (ref 21–32)
CREAT SERPL-MCNC: 0.6 MG/DL (ref 0.6–1.2)
DEPRECATED RDW RBC AUTO: 14.1 % (ref 12.4–15.4)
EOSINOPHIL # BLD: 0.1 K/UL (ref 0–0.6)
EOSINOPHIL NFR BLD: 1.8 %
GFR SERPLBLD CREATININE-BSD FMLA CKD-EPI: >60 ML/MIN/{1.73_M2}
GLUCOSE SERPL-MCNC: 92 MG/DL (ref 70–99)
HCT VFR BLD AUTO: 38.7 % (ref 36–48)
HGB BLD-MCNC: 13.1 G/DL (ref 12–16)
LYMPHOCYTES # BLD: 0.8 K/UL (ref 1–5.1)
LYMPHOCYTES NFR BLD: 15.8 %
MCH RBC QN AUTO: 29 PG (ref 26–34)
MCHC RBC AUTO-ENTMCNC: 33.8 G/DL (ref 31–36)
MCV RBC AUTO: 85.8 FL (ref 80–100)
MONOCYTES # BLD: 0.5 K/UL (ref 0–1.3)
MONOCYTES NFR BLD: 9.8 %
NEUTROPHILS # BLD: 3.5 K/UL (ref 1.7–7.7)
NEUTROPHILS NFR BLD: 72.1 %
PLATELET # BLD AUTO: 135 K/UL (ref 135–450)
PMV BLD AUTO: 9.3 FL (ref 5–10.5)
POTASSIUM SERPL-SCNC: 3.6 MMOL/L (ref 3.5–5.1)
RBC # BLD AUTO: 4.51 M/UL (ref 4–5.2)
SODIUM SERPL-SCNC: 141 MMOL/L (ref 136–145)
WBC # BLD AUTO: 4.9 K/UL (ref 4–11)

## 2023-12-28 PROCEDURE — 99232 SBSQ HOSP IP/OBS MODERATE 35: CPT | Performed by: INTERNAL MEDICINE

## 2023-12-28 PROCEDURE — 85025 COMPLETE CBC W/AUTO DIFF WBC: CPT

## 2023-12-28 PROCEDURE — 6360000002 HC RX W HCPCS: Performed by: STUDENT IN AN ORGANIZED HEALTH CARE EDUCATION/TRAINING PROGRAM

## 2023-12-28 PROCEDURE — 36415 COLL VENOUS BLD VENIPUNCTURE: CPT

## 2023-12-28 PROCEDURE — 97129 THER IVNTJ 1ST 15 MIN: CPT

## 2023-12-28 PROCEDURE — 97116 GAIT TRAINING THERAPY: CPT | Performed by: PHYSICAL THERAPIST

## 2023-12-28 PROCEDURE — 92523 SPEECH SOUND LANG COMPREHEN: CPT

## 2023-12-28 PROCEDURE — 97110 THERAPEUTIC EXERCISES: CPT | Performed by: PHYSICAL THERAPIST

## 2023-12-28 PROCEDURE — 97530 THERAPEUTIC ACTIVITIES: CPT | Performed by: PHYSICAL THERAPIST

## 2023-12-28 PROCEDURE — 92610 EVALUATE SWALLOWING FUNCTION: CPT

## 2023-12-28 PROCEDURE — 2580000003 HC RX 258: Performed by: STUDENT IN AN ORGANIZED HEALTH CARE EDUCATION/TRAINING PROGRAM

## 2023-12-28 PROCEDURE — 80048 BASIC METABOLIC PNL TOTAL CA: CPT

## 2023-12-28 PROCEDURE — 6370000000 HC RX 637 (ALT 250 FOR IP): Performed by: STUDENT IN AN ORGANIZED HEALTH CARE EDUCATION/TRAINING PROGRAM

## 2023-12-28 PROCEDURE — 1280000000 HC REHAB R&B

## 2023-12-28 RX ORDER — ASPIRIN 300 MG/1
300 SUPPOSITORY RECTAL DAILY
Status: CANCELLED | OUTPATIENT
Start: 2023-12-29

## 2023-12-28 RX ORDER — ENOXAPARIN SODIUM 100 MG/ML
40 INJECTION SUBCUTANEOUS DAILY
Status: DISPENSED | OUTPATIENT
Start: 2023-12-29

## 2023-12-28 RX ORDER — SODIUM CHLORIDE 0.9 % (FLUSH) 0.9 %
5-40 SYRINGE (ML) INJECTION EVERY 12 HOURS SCHEDULED
Status: DISCONTINUED | OUTPATIENT
Start: 2023-12-29 | End: 2024-01-04

## 2023-12-28 RX ORDER — LOSARTAN POTASSIUM 25 MG/1
25 TABLET ORAL DAILY
Status: CANCELLED | OUTPATIENT
Start: 2023-12-29

## 2023-12-28 RX ORDER — ASPIRIN 81 MG/1
81 TABLET, CHEWABLE ORAL DAILY
Status: CANCELLED | OUTPATIENT
Start: 2023-12-29

## 2023-12-28 RX ORDER — BISACODYL 5 MG/1
5 TABLET, DELAYED RELEASE ORAL DAILY
Status: CANCELLED | OUTPATIENT
Start: 2023-12-28

## 2023-12-28 RX ORDER — LOSARTAN POTASSIUM 25 MG/1
25 TABLET ORAL DAILY
Status: DISPENSED | OUTPATIENT
Start: 2023-12-29

## 2023-12-28 RX ORDER — ONDANSETRON 4 MG/1
4 TABLET, ORALLY DISINTEGRATING ORAL EVERY 8 HOURS PRN
Status: DISPENSED | OUTPATIENT
Start: 2023-12-28

## 2023-12-28 RX ORDER — ACETAMINOPHEN 325 MG/1
650 TABLET ORAL EVERY 4 HOURS PRN
Status: CANCELLED | OUTPATIENT
Start: 2023-12-28

## 2023-12-28 RX ORDER — POLYETHYLENE GLYCOL 3350 17 G/17G
17 POWDER, FOR SOLUTION ORAL DAILY PRN
Status: ACTIVE | OUTPATIENT
Start: 2023-12-28

## 2023-12-28 RX ORDER — CARVEDILOL 25 MG/1
25 TABLET ORAL 2 TIMES DAILY WITH MEALS
Status: CANCELLED | OUTPATIENT
Start: 2023-12-28

## 2023-12-28 RX ORDER — ASPIRIN 81 MG/1
81 TABLET, CHEWABLE ORAL DAILY
Status: DISPENSED | OUTPATIENT
Start: 2023-12-29

## 2023-12-28 RX ORDER — ENOXAPARIN SODIUM 100 MG/ML
40 INJECTION SUBCUTANEOUS DAILY
Status: CANCELLED | OUTPATIENT
Start: 2023-12-28

## 2023-12-28 RX ORDER — SODIUM CHLORIDE 0.9 % (FLUSH) 0.9 %
5-40 SYRINGE (ML) INJECTION EVERY 12 HOURS SCHEDULED
Status: CANCELLED | OUTPATIENT
Start: 2023-12-28

## 2023-12-28 RX ORDER — ATORVASTATIN CALCIUM 80 MG/1
80 TABLET, FILM COATED ORAL NIGHTLY
Status: CANCELLED | OUTPATIENT
Start: 2023-12-28

## 2023-12-28 RX ORDER — ACETAMINOPHEN 325 MG/1
650 TABLET ORAL EVERY 4 HOURS PRN
Status: DISPENSED | OUTPATIENT
Start: 2023-12-28

## 2023-12-28 RX ORDER — SODIUM CHLORIDE 0.9 % (FLUSH) 0.9 %
5-40 SYRINGE (ML) INJECTION PRN
Status: ACTIVE | OUTPATIENT
Start: 2023-12-28

## 2023-12-28 RX ORDER — ONDANSETRON 4 MG/1
4 TABLET, ORALLY DISINTEGRATING ORAL EVERY 8 HOURS PRN
Status: CANCELLED | OUTPATIENT
Start: 2023-12-28

## 2023-12-28 RX ORDER — POLYETHYLENE GLYCOL 3350 17 G/17G
17 POWDER, FOR SOLUTION ORAL DAILY PRN
Status: CANCELLED | OUTPATIENT
Start: 2023-12-28

## 2023-12-28 RX ORDER — ASPIRIN 300 MG/1
300 SUPPOSITORY RECTAL DAILY
Status: ACTIVE | OUTPATIENT
Start: 2023-12-29

## 2023-12-28 RX ORDER — ONDANSETRON 2 MG/ML
4 INJECTION INTRAMUSCULAR; INTRAVENOUS EVERY 6 HOURS PRN
Status: CANCELLED | OUTPATIENT
Start: 2023-12-28

## 2023-12-28 RX ORDER — ATORVASTATIN CALCIUM 80 MG/1
80 TABLET, FILM COATED ORAL NIGHTLY
Qty: 30 TABLET | Refills: 3 | Status: ON HOLD | OUTPATIENT
Start: 2023-12-28

## 2023-12-28 RX ORDER — BISACODYL 5 MG/1
5 TABLET, DELAYED RELEASE ORAL DAILY
Status: DISPENSED | OUTPATIENT
Start: 2023-12-29

## 2023-12-28 RX ORDER — ASPIRIN 81 MG/1
81 TABLET, CHEWABLE ORAL DAILY
Qty: 30 TABLET | Refills: 3 | Status: ON HOLD | OUTPATIENT
Start: 2023-12-29

## 2023-12-28 RX ORDER — ONDANSETRON 2 MG/ML
4 INJECTION INTRAMUSCULAR; INTRAVENOUS EVERY 6 HOURS PRN
Status: ACTIVE | OUTPATIENT
Start: 2023-12-28

## 2023-12-28 RX ORDER — SODIUM CHLORIDE 0.9 % (FLUSH) 0.9 %
5-40 SYRINGE (ML) INJECTION PRN
Status: CANCELLED | OUTPATIENT
Start: 2023-12-28

## 2023-12-28 RX ORDER — CARVEDILOL 12.5 MG/1
25 TABLET ORAL 2 TIMES DAILY WITH MEALS
Status: DISPENSED | OUTPATIENT
Start: 2023-12-29

## 2023-12-28 RX ORDER — ATORVASTATIN CALCIUM 80 MG/1
80 TABLET, FILM COATED ORAL NIGHTLY
Status: DISPENSED | OUTPATIENT
Start: 2023-12-29

## 2023-12-28 RX ADMIN — CARVEDILOL 25 MG: 25 TABLET, FILM COATED ORAL at 08:38

## 2023-12-28 RX ADMIN — CARVEDILOL 25 MG: 25 TABLET, FILM COATED ORAL at 16:58

## 2023-12-28 RX ADMIN — SODIUM CHLORIDE, PRESERVATIVE FREE 10 ML: 5 INJECTION INTRAVENOUS at 21:15

## 2023-12-28 RX ADMIN — LOSARTAN POTASSIUM 25 MG: 25 TABLET, FILM COATED ORAL at 08:39

## 2023-12-28 RX ADMIN — ENOXAPARIN SODIUM 40 MG: 100 INJECTION SUBCUTANEOUS at 08:37

## 2023-12-28 RX ADMIN — ATORVASTATIN CALCIUM 80 MG: 80 TABLET, FILM COATED ORAL at 21:15

## 2023-12-28 RX ADMIN — ASPIRIN 81 MG: 81 TABLET, CHEWABLE ORAL at 08:39

## 2023-12-28 ASSESSMENT — PAIN SCALES - GENERAL
PAINLEVEL_OUTOF10: 0
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
## 2023-12-28 NOTE — PLAN OF CARE
Problem: SLP Adult - Disturbed Thought Process  Goal: By Discharge: Demonstrates cognitive skills at highest level of function for planned discharge setting. See evaluation for individualized goals. Outcome: Progressing    Intervention: Speech Evaluation/treatment  SLP completed evaluation. Please refer to notes in EMR.     Electronically signed by:  Mark Salas M.A., [place]  Speech-Language Pathologist  Pg #: [phone]

## 2023-12-29 LAB
ANION GAP SERPL CALCULATED.3IONS-SCNC: 9 MMOL/L (ref 3–16)
BASOPHILS # BLD: 0 K/UL (ref 0–0.2)
BASOPHILS NFR BLD: 0.3 %
BUN SERPL-MCNC: 11 MG/DL (ref 7–20)
CALCIUM SERPL-MCNC: 9.6 MG/DL (ref 8.3–10.6)
CHLORIDE SERPL-SCNC: 103 MMOL/L (ref 99–110)
CO2 SERPL-SCNC: 27 MMOL/L (ref 21–32)
CREAT SERPL-MCNC: 0.7 MG/DL (ref 0.6–1.2)
DEPRECATED RDW RBC AUTO: 13.9 % (ref 12.4–15.4)
EOSINOPHIL # BLD: 0.1 K/UL (ref 0–0.6)
EOSINOPHIL NFR BLD: 1.5 %
GFR SERPLBLD CREATININE-BSD FMLA CKD-EPI: >60 ML/MIN/{1.73_M2}
GLUCOSE SERPL-MCNC: 95 MG/DL (ref 70–99)
HCT VFR BLD AUTO: 39.1 % (ref 36–48)
HGB BLD-MCNC: 13.2 G/DL (ref 12–16)
LYMPHOCYTES # BLD: 1.1 K/UL (ref 1–5.1)
LYMPHOCYTES NFR BLD: 12 %
MCH RBC QN AUTO: 28.8 PG (ref 26–34)
MCHC RBC AUTO-ENTMCNC: 33.8 G/DL (ref 31–36)
MCV RBC AUTO: 85.4 FL (ref 80–100)
MONOCYTES # BLD: 0.7 K/UL (ref 0–1.3)
MONOCYTES NFR BLD: 7.5 %
NEUTROPHILS # BLD: 7 K/UL (ref 1.7–7.7)
NEUTROPHILS NFR BLD: 78.7 %
PLATELET # BLD AUTO: 131 K/UL (ref 135–450)
PMV BLD AUTO: 9.4 FL (ref 5–10.5)
POTASSIUM SERPL-SCNC: 3.9 MMOL/L (ref 3.5–5.1)
RBC # BLD AUTO: 4.58 M/UL (ref 4–5.2)
SODIUM SERPL-SCNC: 139 MMOL/L (ref 136–145)
WBC # BLD AUTO: 8.9 K/UL (ref 4–11)

## 2023-12-29 PROCEDURE — 97166 OT EVAL MOD COMPLEX 45 MIN: CPT

## 2023-12-29 PROCEDURE — 36415 COLL VENOUS BLD VENIPUNCTURE: CPT

## 2023-12-29 PROCEDURE — 2580000003 HC RX 258: Performed by: PHYSICAL MEDICINE & REHABILITATION

## 2023-12-29 PROCEDURE — 85025 COMPLETE CBC W/AUTO DIFF WBC: CPT

## 2023-12-29 PROCEDURE — 6360000002 HC RX W HCPCS: Performed by: PHYSICAL MEDICINE & REHABILITATION

## 2023-12-29 PROCEDURE — 97535 SELF CARE MNGMENT TRAINING: CPT

## 2023-12-29 PROCEDURE — 80048 BASIC METABOLIC PNL TOTAL CA: CPT

## 2023-12-29 PROCEDURE — 97116 GAIT TRAINING THERAPY: CPT | Performed by: PHYSICAL THERAPIST

## 2023-12-29 PROCEDURE — 97162 PT EVAL MOD COMPLEX 30 MIN: CPT | Performed by: PHYSICAL THERAPIST

## 2023-12-29 PROCEDURE — 97530 THERAPEUTIC ACTIVITIES: CPT

## 2023-12-29 PROCEDURE — 97129 THER IVNTJ 1ST 15 MIN: CPT

## 2023-12-29 PROCEDURE — 92523 SPEECH SOUND LANG COMPREHEN: CPT

## 2023-12-29 PROCEDURE — 1280000000 HC REHAB R&B

## 2023-12-29 PROCEDURE — 97530 THERAPEUTIC ACTIVITIES: CPT | Performed by: PHYSICAL THERAPIST

## 2023-12-29 PROCEDURE — 6370000000 HC RX 637 (ALT 250 FOR IP): Performed by: PHYSICAL MEDICINE & REHABILITATION

## 2023-12-29 RX ORDER — PANTOPRAZOLE SODIUM 40 MG/1
40 TABLET, DELAYED RELEASE ORAL
Status: DISPENSED | OUTPATIENT
Start: 2023-12-30

## 2023-12-29 RX ORDER — LOPERAMIDE HYDROCHLORIDE 2 MG/1
2 CAPSULE ORAL EVERY 6 HOURS PRN
Status: DISPENSED | OUTPATIENT
Start: 2023-12-29

## 2023-12-29 RX ORDER — ESCITALOPRAM OXALATE 5 MG/1
5 TABLET ORAL DAILY
Status: DISPENSED | OUTPATIENT
Start: 2023-12-29

## 2023-12-29 RX ADMIN — ASPIRIN 81 MG: 81 TABLET, CHEWABLE ORAL at 10:13

## 2023-12-29 RX ADMIN — ATORVASTATIN CALCIUM 80 MG: 80 TABLET, FILM COATED ORAL at 20:08

## 2023-12-29 RX ADMIN — LOPERAMIDE HYDROCHLORIDE 2 MG: 2 CAPSULE ORAL at 18:03

## 2023-12-29 RX ADMIN — CARVEDILOL 25 MG: 12.5 TABLET, FILM COATED ORAL at 10:13

## 2023-12-29 RX ADMIN — ENOXAPARIN SODIUM 40 MG: 100 INJECTION SUBCUTANEOUS at 10:14

## 2023-12-29 RX ADMIN — CARVEDILOL 25 MG: 12.5 TABLET, FILM COATED ORAL at 18:03

## 2023-12-29 RX ADMIN — LOSARTAN POTASSIUM 25 MG: 25 TABLET, FILM COATED ORAL at 10:12

## 2023-12-29 RX ADMIN — ESCITALOPRAM 5 MG: 5 TABLET, FILM COATED ORAL at 10:28

## 2023-12-29 RX ADMIN — ONDANSETRON 4 MG: 4 TABLET, ORALLY DISINTEGRATING ORAL at 20:08

## 2023-12-29 RX ADMIN — SODIUM CHLORIDE, PRESERVATIVE FREE 10 ML: 5 INJECTION INTRAVENOUS at 18:04

## 2023-12-29 RX ADMIN — BISACODYL 5 MG: 5 TABLET, COATED ORAL at 10:12

## 2023-12-29 ASSESSMENT — PAIN SCALES - GENERAL
PAINLEVEL_OUTOF10: 0

## 2023-12-29 NOTE — PLAN OF CARE
Problem: Safety - Adult  Goal: Free from fall injury  Outcome: Progressing     Problem: Skin/Tissue Integrity  Goal: Absence of new skin breakdown  Description: 1.  Monitor for areas of redness and/or skin breakdown  2.  Assess vascular access sites hourly  3.  Every 4-6 hours minimum:  Change oxygen saturation probe site  4.  Every 4-6 hours:  If on nasal continuous positive airway pressure, respiratory therapy assess nares and determine need for appliance change or resting period.  Outcome: Progressing

## 2023-12-29 NOTE — PROGRESS NOTES
activity; pt mildly impulsive and forgetful  Orientation  Overall Orientation Status: Impaired  Orientation Level: Oriented to place;Oriented to situation;Oriented to person;Disoriented to time  Perception  Unilateral Attention: Cues to attend left visual field     Sensation  Overall Sensation Status: Impaired (Appears impaired on L especially proprioception ,.AEB positioning of the L extrem when attempting functional tasks ( washing hands,donning a brief))         Education Given To: Patient  Education Provided: Role of Therapy;Plan of Care;ADL Adaptive Strategies;Transfer Training  Education Method: Demonstration;Verbal  Barriers to Learning: Cognition;Vision (L neglect vs field cut)  Education Outcome: Continued education needed                      Goals  Short Term Goals  Time Frame for Short Term Goals: 14 days  Short Term Goal 1: Pt will perform UB and LB dressing routine with AE as needed with modified independence  Short Term Goal 2: Pt will perform bathing routine with supervision  Short Term Goal 3: Pt will scan/locate items in L visual field with no cues required using compensatory techniques as needed  Short Term Goal 4: Pt will perform toileting with mod I  Short Term Goal 5: Pt will perform simple iADL task with mod I  Additional Goals?: Yes  Patient Goals   Patient goals : \"to get better and do for myself again\"       Therapy Time   Individual Concurrent Group Co-treatment   Time In 1245         Time Out 1408         Minutes 83         Timed Code Treatment Minutes: 68 Minutes (+ 15 min OT emmy)       Luly Navarro OT
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
## 2023-12-29 NOTE — PROGRESS NOTES
Pt found sitting up in her recliner with her feet elevated up on a foot stool. Physical assessment and vital signs as charted. Pt currently denies experiencing any pain at this time. Chair alarm engaged. Call light placed within reach. RN will continue to monitor Pt.
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
## 2023-12-29 NOTE — PROGRESS NOTES
SLP ALL NOTES  Facility/Department: [facility] ACUTE REHAB UNIT  Initial Speech/Language/Cognitive Assessment/Treatment     NAME: Halina Rae  : 1945   MRN: [id number]  ADMISSION DATE: 2023  ADMITTING DIAGNOSIS: has Cerebrovascular accident (CVA) due to embolism of right middle cerebral artery (HCC); Altered mental status; Encounter for loop recorder check; and Acute cerebrovascular accident (CVA) (HCC) on their problem list.  DATE ONSET: 23    Date of Eval: 2023   Evaluating Therapist: ROLANDA Rivas    RECENT RESULTS  CT OF HEAD/MRI: 23  IMPRESSION:     1.  Acute right MCA territory infarct. 2 areas of curvilinear susceptibility on  SWI images at the anterior margin of the infarct could represent a thrombosed  vessel versus hemorrhage.  Recommend CT head without contrast and CTA head and  neck with contrast for further evaluation.    Primary Complaint: none stated    Pain:  Denied    Vision/ Hearing  Vision  Vision: Within Functional Limits  Hearing  Hearing: Exceptions to WFL  Hearing Exceptions: Hard of hearing/hearing concerns    Assessment:  Cognitive Diagnosis: moderate-severe cognitive-linguistic impairment   Diagnosis: Pt presents with moderate-severe cognitive-linguistic impairment s/p R MCA CVA. Pt was alert, pleasant, and agreeable to SLP visit. Oriented to person, place, situation, but unable to determine correct date, month or year independently. Pt was able to answer all social/functional questions but suspect requires confirmation from family members. Pt demo'd impairments in the area of initiation, impulsivity, attention, and ability to self-monitor/correct. Pt also demonstrated severe executive dysfunction. Pt's initiation and impulsivity was inconsistent throughout session as pt with times of delayed verbal responses and then impulsivity was exhibited when pt began tasks prior to SLP completing verbal instructions and physical demonstrations. Pt with reduced

## 2023-12-29 NOTE — FLOWSHEET NOTE
12/28/23 2209   Vital Signs   Temp 98.2 °F (36.8 °C)   Temp Source Oral   Pulse 68   Heart Rate Source Monitor   Respirations 16   /66   MAP (Calculated) 86     Patient resting in bed upon entering room. Patient transferred from [facility] to [facility]. Patient has no complaints of pain, vitals as shown. Bed alarm on and call light within reach.

## 2023-12-29 NOTE — PROGRESS NOTES
NURSING ASSESSMENT: ARU ADMISSION  [facility]    Patient:Halina Rae     Rehab Dx/Hx: CVA (cerebral vascular accident) (HCC) [I63.9]  Acute cerebrovascular accident (CVA) (HCC) [I63.9]   :1945  MRN:[id number]  Date of Admit: 2023  Room #: 3105/3105-01    Subjective:   Patient admitted to [facility] from 5507. Alert and oriented x4.  Oriented to room and call light system. Oriented to rehab routine and therapy schedules. Informed about care conferences and ordering of meals.      Is this a Stroke Patient?  [x]  Yes.   If yes, was the PATH-s assessment given to the patient/family?  []  Yes     [x]   No    Family not present, left at patient bedside and made patient aware it is for family.      []   No      Drug / Medication Review:   Medications were reviewed by RN at time of admission  [x]  No potential or actual clinically significant medication issues were noted.      []   Yes, a clinically significant medication issue was identified                 []  Adverse Drug Event:                  []  Allergy:                  []  Side Effect:                  []  Ineffective Therapy:                  []  Drug Interaction:                 []  Duplicated Therapy:                 []  Untreated Indication:                  []  Non-adherence:                 []  Other:                  Nursing/Pharmacy contacted the physician:     Date:              Time:                  Actions recommended by physician were completed:   Date :            Time:    4 Eyes Skin Assessment   The patient is being assessed for: Admission     I agree that 2 RN's have performed a thorough Head to Toe Skin Assessment on the patient. ALL assessment sites listed below have been assessed.       Areas assessed by both nurses:   [x]   Head, Face, and Ears   [x]   Shoulders, Back, and Chest, Abdomen  [x]   Arms, Elbows, and Hands   [x]   Coccyx, Sacrum, and Ischium  [x]   Legs, Feet, and Heel    Patient has some scattered

## 2023-12-29 NOTE — CONSULTS
Comprehensive Nutrition Assessment    RECOMMENDATIONS:  PO Diet: Continue regular diet  ONS: D/c Ensure +HP; start Magic Cup once daily  Nutrition Education: Education not indicated     NUTRITION ASSESSMENT:   Nutritional summary & status: Consult for new admit to ARU.  Pt s/p CVA w/ minimal deficits.  PO intake fair through adm- averaging ~50% per meal.  ONS added by MD d/t low BMI.  EMR shows wt loss over 1 year (-12%- insignificant).  Pt endorses no changes in appetite since adm. Pt reports disliking ONS. RD rec'd protein source w/ all meals to promote intake and reviewed protein sources. Pt expressed understanding.   Admission // PMH: CVA // CHF, hypertension, arthritis, depression    MALNUTRITION ASSESSMENT  Context of Malnutrition: Acute Illness   Malnutrition Status: No malnutrition  Findings of the 6 clinical characteristics of malnutrition (Minimum of 2 out of 6 clinical characteristics is required to make the diagnosis of moderate or severe Protein Calorie Malnutrition based on AND/ASPEN Guidelines):  Energy Intake:  No significant decrease in energy intake  Weight Loss:  No significant weight loss     Body Fat Loss:  No significant body fat loss     Muscle Mass Loss:  No significant muscle mass loss      NUTRITION DIAGNOSIS   Increased nutrient needs related to increase demand for energy/nutrients as evidenced by  (extended PT/OT)    Nutrition Monitoring and Evaluation:   Food/Nutrient Intake Outcomes:  Food and Nutrient Intake, Supplement Intake  Physical Signs/Symptoms Outcomes:  Biochemical Data, Nutrition Focused Physical Findings, Weight     OBJECTIVE DATA: Significant to nutrition assessment  Nutrition Related Findings: +bm 12/29; no edema; labs reviewed  Wounds: None  Nutrition Goals: PO intake 75% or greater, by next RD assessment     CURRENT NUTRITION THERAPIES  ADULT DIET; Regular  ADULT ORAL NUTRITION SUPPLEMENT; Lunch, Dinner; Standard High Calorie/High Protein Oral Supplement  PO

## 2023-12-29 NOTE — PROGRESS NOTES
ARU Admission Assessment    Ethnicity  \"Are you of , /a, or Iranian origin?\"  Check all that apply:  [x] A.  No, not of , /a, or Iranian Origin  [] B.  Yes, Paraguayan, Paraguayan American, Chicano/a  [] C.  Yes, [place] citizen  [] D.  Yes, Kev  [] E.  Yes, another , , or Iranian origin  [] X.  Patient unable to respond  [] Y.  Patient declines to respond    Race  \"What is your race?\"  Check all that apply:  [] A.  White  [x] B.  Black or   [] C.   or   [] D.   Burmese  [] E.  Chinese  [] F.  Haitian  [] G. Indian  [] H.  Chinese  [] I.  Belarusian  [] J.  Other   [] K.    [] L.  Eritrean or Laura  [] M.  English  [] N.  Other   [] X.  Patient unable to respond  [] Y.  Patient declines to respond  [] Z.  None of the above    Language  A.  \"What is your preferred language?\"   English     B.  \"Do you need or want an  to communicate with a doctor or health care staff?\"  Check only one:  [x] 0.  No  [] 1.  Yes  [] 9.  Unable to determine    Transportation  \"Has lack of transportation kept you from medical appointments, meetings, work, or from getting things needed for daily living?\"Check all that apply:  [] A.  Yes, it has kept me from medical appointments or from getting my medications  [] B.  Yes, it has kept me from non-medical meetings, appointments, work, or from getting things that I need  [x] C.  No  [] X.  Patient unable to respond  [] Y.  Patient declines to respond    Hearing  Ability to hear (with hearing aid or hearing appliances if normally used)  [x]  0.  Adequate - no difficulty in normal conversation, social interaction, listening to TV  []  1.  Minimal difficulty - difficulty in some environments (e.g. when person speaks softly or setting is noisy)  []  2.  Moderate difficulty - speaker has to increase volume and speak distinctly   []  3.  Highly impaired - absence of  score 0-27, or enter 99 if unable to complete (if symptom frequency (column 2) is blank for 3 or more items).        Social Isolation  \"How often do you feel lonely or isolated from those around you?\"  [] 0.  Never  [x] 1.  Rarely  [] 2.  Sometimes  [] 3.  Often  [] 4.  Always  [] 7.  Patient declines to respond  [] 8.  Patient unable to respond    Pain Effect on Sleep  \"Over the past 5 days, how much of the time has pain made it hard for you to sleep at night?\"  []  0.  Does not apply - I have not had any pain or hurting in the past 5 days  [x]  1.  Rarely or not at all  []  2.  Occasionally  []  3.  Frequently  []  4.  Almost constantly  []  8.  Unable to answer    **If the patient answers \"0.  Does not apply\" to this question, skip the next two \"Pain Effect...\" questions**    Pain Interference with Therapy Activities  \"Over the past 5 days, how often have you limited your participation in rehabilitation therapy sessions due to pain?\"  []  0.  Does not apply - I have not received rehabilitation therapy in the past 5 days  [x]  1.  Rarely or not at all  []  2.  Occasionally  []  3.  Frequently  []  4.  Almost constantly  []  8.  Unable to answer    Pain Interference with Day-to-Day Activities:  \"Over the past 5 days, how often have you limited your day-to-day activities (excluding rehabilitation therapy session)?\"  [x]  1.  Rarely or not at all  []  2.  Occasionally  []  3.  Frequently  []  4.  Almost constantly  []  8.  Unable to answer    Nutritional Approaches  Check all of the following nutritional approaches that apply on admission:  []  A.  Parenteral/IV feeding (including IV fluids if needed for hydration, but not as part of dialysis/chemo)  []  B.  Feeding tube (e.g., nasogastric or abdominal (PEG))  []  C.  Mechanically altered diet - requires change in texture of food or liquids (e.g., pureed food, thickened liquids)  []  D.  Therapeutic diet (e.g., low salt, diabetic, low cholesterol)  [x]  Z.  None of

## 2023-12-29 NOTE — CARE COORDINATION
Case Management Assessment  Initial Evaluation    Date/Time of Evaluation: 12/29/2023 2:12 PM  Assessment Completed by: JUDY REESE    If patient is discharged prior to next notation, then this note serves as note for discharge by case management.    Patient Name: Halina Rae                   YOB: 1945  Diagnosis: CVA (cerebral vascular accident) (HCC) [I63.9]  Acute cerebrovascular accident (CVA) (HCC) [I63.9]                   Date / Time: 12/28/2023 10:00 PM    Patient Admission Status: REHAB IP   Readmission Risk (Low < 19, Mod (19-27), High > 27): Readmission Risk Score: 13.7    Current PCP: Simone Vera MD  PCP verified by CM? Yes    Chart Reviewed: Yes      History Provided by: Medical Record  Patient Orientation: Alert and Oriented    Patient Cognition: Alert    Hospitalization in the last 30 days (Readmission):  No    If yes, Readmission Assessment in CM Navigator will be completed.    Advance Directives:      Code Status: Full Code   Patient's Primary Decision Maker is: Legal Next of Kin      Discharge Planning:    Patient lives with: Children, Family Members Type of Home: House  Primary Care Giver: Self  Patient Support Systems include: Children, Family Members   Current Financial resources: Medicare  Current community resources: None  Current services prior to admission: None            Current DME:              Type of Home Care services:  None    ADLS  Prior functional level: Independent in ADLs/IADLs  Current functional level: Assistance with the following:, Bathing, Dressing, Toileting, Cooking, Housework, Shopping, Mobility    PT AM-PAC:   /24  OT AM-PAC:   /24    Family can provide assistance at DC: Yes  Would you like Case Management to discuss the discharge plan with any other family members/significant others, and if so, who? Yes  Plans to Return to Present Housing: Unknown at present  Other Identified Issues/Barriers to RETURNING to current housing: na  Potential

## 2023-12-29 NOTE — PLAN OF CARE
ARU PATIENT TREATMENT PLAN  [id number] [facility] [place] [address]  [phone]    Halina Rae    : 1945  [id number]  MRN: [id number]  PHYSICIAN:  Gerald Weaver DO  Primary Problem    Patient Active Problem List   Diagnosis    Cerebrovascular accident (CVA) due to embolism of right middle cerebral artery (HCC)    Altered mental status    Encounter for loop recorder check    Acute cerebrovascular accident (CVA) (HCC)       Rehabilitation Diagnosis:  Stroke, 1.1, Left Body (R Brain)  ADMIT DATE:2023    Patient Goals: \"to get better and do for myself again\"   Admitting Impairments: Decreased functional mobility ;Decreased ADL status;Decreased coordination;Decreased cognition;Decreased safe awareness;Decreased balance;Decreased fine motor control;Decreased strength;Decreased vision/visual deficit;Decreased high-level IADLs;  moderate-severe cognitive-linguistic impairment   Activity Restrictions: None  Participation Limitations: None   CARE PLAN   NURSING:  Halina Rae while on this unit will:  [x] Be continent of bowel and bladder     [x] Have an adequate number of bowel movements  [x] Urinate with no urinary retention >300ml in bladder  [x] Complete bladder protocol with varner removal  [x] Maintain O2 SATs at 92%  [x] Have pain managed while on ARU       [x] Be pain free by discharge   [x] Have no skin breakdown while on ARU  [x] Have improved skin integrity via wound measurements  [x] Have no signs/symptoms of infection at the wound site  [x] Be free from injury during hospitalization   [x] Complete education with patient/family with understanding demonstrated for:       Nursing Interventions will include:     [x] education for medical assistive devices   [x] medication education    [x] energy conservation   [x] stress management techniques   [x] fall prevention   [x] alarms protocol   [x] seating and positioning   [x] skin/wound care   [x]  involvement, this patient will be seen 15 hours per week (900 minutes within a 7day period).    In addition, dietician/nutritionist may monitor calorie count as well as intake and collaboratively work with SLP on dietary upgrades.  Neuropsychology/Psychology may evaluate and provide necessary support.    Medical issues being managed closely and that require 24hour availability of a physician:  [] Swallowing Precautions  [x] Bowel/Bladder Fx  [] Weight bearing precautions  [] Wound Care    [x] Pain Mgmt   [] Infection Protection  [x] DVT Prophylaxis   [x] Fall Precautions  [x] Fluid/Electrolyte/Nutrition Balance  [x] Voice Protection   [] Respiratory  [] Other:    Medical Prognosis: [x] Good  [] Fair    [] Guarded   Total expected IRF days 14  Anticipated discharge destination:   [] Home Independently   [] Home Modified Independent  [x] Home with supervision    []SNF     [] Other                                           Physician anticipated functional outcomes:Pt will progress to a level of supervision to MOD I for all functional mobility and ADLs to allow for a safe return home.      IPOC brief synthesis: Patient is a 78-year-old female with past medical history of hypertension, arthritis, depression who presents to the ED due to complaints of confusion, gait instability.     This initial ARU patient treatment plan of care, together with the IPOC & the Education plan, form the foundation for the patient's plan of care.  Weekly patient care conferences are held to evaluate progress towards the initial treatment plan & goals.    I have reviewed this initial plan of care and agree with its contents:    Title   Name    Date    Time    Physician:   AMADOR LopesPBreannH  PM&R  1/1/2024  12:15 PM    Case Mgmt: YAJAIRA Pena 12/29/2023 @ 1218    OT: CHRISTEL Blum/JESSE 12/29/2023 @ 15:21    PT: Rosy Martinez LPT #[id number] 12/28/2023 @1116    RN: Asmita JENKINS RN 12/28/2023 22:34    ST: Magy Parsons CCC-SLP 12/29/23 @

## 2023-12-30 PROCEDURE — 97110 THERAPEUTIC EXERCISES: CPT

## 2023-12-30 PROCEDURE — 6370000000 HC RX 637 (ALT 250 FOR IP): Performed by: PHYSICAL MEDICINE & REHABILITATION

## 2023-12-30 PROCEDURE — 97130 THER IVNTJ EA ADDL 15 MIN: CPT

## 2023-12-30 PROCEDURE — 97129 THER IVNTJ 1ST 15 MIN: CPT

## 2023-12-30 PROCEDURE — 97535 SELF CARE MNGMENT TRAINING: CPT

## 2023-12-30 PROCEDURE — 97530 THERAPEUTIC ACTIVITIES: CPT

## 2023-12-30 PROCEDURE — 6360000002 HC RX W HCPCS: Performed by: PHYSICAL MEDICINE & REHABILITATION

## 2023-12-30 PROCEDURE — 97112 NEUROMUSCULAR REEDUCATION: CPT

## 2023-12-30 PROCEDURE — 1280000000 HC REHAB R&B

## 2023-12-30 PROCEDURE — 97116 GAIT TRAINING THERAPY: CPT

## 2023-12-30 RX ADMIN — ENOXAPARIN SODIUM 40 MG: 100 INJECTION SUBCUTANEOUS at 08:20

## 2023-12-30 RX ADMIN — ASPIRIN 81 MG: 81 TABLET, CHEWABLE ORAL at 08:20

## 2023-12-30 RX ADMIN — ACETAMINOPHEN 650 MG: 325 TABLET ORAL at 20:06

## 2023-12-30 RX ADMIN — CARVEDILOL 25 MG: 12.5 TABLET, FILM COATED ORAL at 08:20

## 2023-12-30 RX ADMIN — ESCITALOPRAM 5 MG: 5 TABLET, FILM COATED ORAL at 08:20

## 2023-12-30 RX ADMIN — PANTOPRAZOLE SODIUM 40 MG: 40 TABLET, DELAYED RELEASE ORAL at 06:05

## 2023-12-30 RX ADMIN — ATORVASTATIN CALCIUM 80 MG: 80 TABLET, FILM COATED ORAL at 20:06

## 2023-12-30 RX ADMIN — CARVEDILOL 25 MG: 12.5 TABLET, FILM COATED ORAL at 18:01

## 2023-12-30 RX ADMIN — LOSARTAN POTASSIUM 25 MG: 25 TABLET, FILM COATED ORAL at 08:20

## 2023-12-30 ASSESSMENT — PAIN - FUNCTIONAL ASSESSMENT: PAIN_FUNCTIONAL_ASSESSMENT: ACTIVITIES ARE NOT PREVENTED

## 2023-12-30 ASSESSMENT — PAIN DESCRIPTION - ONSET: ONSET: GRADUAL

## 2023-12-30 ASSESSMENT — PAIN DESCRIPTION - LOCATION: LOCATION: HEAD

## 2023-12-30 ASSESSMENT — PAIN DESCRIPTION - FREQUENCY: FREQUENCY: INTERMITTENT

## 2023-12-30 ASSESSMENT — PAIN DESCRIPTION - ORIENTATION: ORIENTATION: RIGHT;LEFT

## 2023-12-30 ASSESSMENT — PAIN DESCRIPTION - DIRECTION: RADIATING_TOWARDS: HEAD

## 2023-12-30 ASSESSMENT — PAIN DESCRIPTION - DESCRIPTORS: DESCRIPTORS: ACHING;DISCOMFORT

## 2023-12-30 ASSESSMENT — PAIN SCALES - GENERAL
PAINLEVEL_OUTOF10: 1
PAINLEVEL_OUTOF10: 3

## 2023-12-30 ASSESSMENT — PAIN DESCRIPTION - PAIN TYPE: TYPE: ACUTE PAIN

## 2023-12-30 NOTE — PLAN OF CARE
Problem: Discharge Planning  Goal: Discharge to home or other facility with appropriate resources  Outcome: Progressing     Problem: Safety - Adult  Goal: Free from fall injury  Outcome: Progressing     Problem: Skin/Tissue Integrity  Goal: Absence of new skin breakdown  Description: 1.  Monitor for areas of redness and/or skin breakdown  2.  Assess vascular access sites hourly  3.  Every 4-6 hours minimum:  Change oxygen saturation probe site  4.  Every 4-6 hours:  If on nasal continuous positive airway pressure, respiratory therapy assess nares and determine need for appliance change or resting period.  Outcome: Progressing     Problem: ABCDS Injury Assessment  Goal: Absence of physical injury  Outcome: Progressing     Problem: Nutrition Deficit:  Goal: Optimize nutritional status  Outcome: Progressing

## 2023-12-30 NOTE — PROGRESS NOTES
Shift assessment completed. VSS. Patient A/O x4 with intermittent confusion. Pain is being managed with PRN and scheduled medications. Call light and over bed table within reach. Hourly rounding and visual checks in place, Safety measures in place, will continue to monitor.

## 2023-12-30 NOTE — PROGRESS NOTES
checking information with patient's family  Prior Function  ADL Assistance: Independent  Homemaking Assistance:  (pt shares home mgt with daughter)  Ambulation Assistance: Independent  Transfer Assistance: Independent  Additional Comments: Daughter works full time,Reports that HHA( M-F for grandson)  would be available to A her as well (?). Would recommend checking information with patient's family    Objective:    Cognition/Orientation: Ox4   Delayed response to stimuli, follows one step commands with extended time and repetition, decreased need for safety and assistance, assistance to identify/correct errors made, decreased awareness of deficits and some vcs to initiate/sequence tasks    Bed mobility - N/A    Functional Mobility   Sit to Stand: CGA - vcs for hand placement and left hand on rw left side as pt puts left hand on front rw bar  Stand to Sit: [facility] vcs for hand placement  Bed to Chair Transfer: N/A  Commode Transfer: [facility] with grab bar right  Shower Transfer: CGA with vcs, stand to sit on TTB then pivot on buttocks to bring LEs into shower with use of grab bar RUE  Ambulation: [facility] with rw to/from bathroom + ~45ft x 2 to/from in/out of IADL suite    ADLs   Grooming: oral care only, CGA in stance, vcs to locate oral care items on left  Bathing: UB setup/SBA, LB setup/CGA - vcs as pt perseverating on washing UEs/LEs multiple times  UB dressing: setup/SBA - vcs to identify/correct errors made and for left sided awareness (Fist pt donned shirt backwards, pt then needed vcs to thread LUEs into long sleeve)  LB dressing: setup/CGA - vcs to identify/correct errors made for left sided awareness and pt threaded LLE into right hospital pants leg twice  Toileting: CGA - hygiene care and pull brief down, pt doffed brief seated on toilet  Footwear:  socks, setup/SBA    UE Exercises N/A    IADL Activities: Pt ambulated to/from and into/out of IADL suite ~40 ft at [facility] with vcs for left hand placement on rw. Pt stood  day        If patient is discharged prior to next treatment, this note will serve as the discharge summary.      ODALIS Galdamez/JESSE [id number]

## 2023-12-30 NOTE — PROGRESS NOTES
Physical Therapy  Facility/Department: [facility] ACUTE REHAB UNIT  Rehabilitation Physical Therapy Treatment Note    NAME: Halina Rae  : 1945 (78 y.o.)  MRN: [id number]  CODE STATUS: Full Code    Date of Service: 23       Restrictions:  Restrictions/Precautions: Up as Tolerated, Fall Risk  Position Activity Restriction  Other position/activity restrictions: up as tolerated     SUBJECTIVE  Subjective  Subjective: Pt sitting in chair and willing to work with therapist. \"I am ready.\" Pt minimally verbal during session.  Pain: Noc/o pain throughout session- notes stiffness in her R knee        Post Treatment Pain Screening         OBJECTIVE  Cognition  Overall Cognitive Status: Exceptions  Arousal/Alertness: Delayed responses to stimuli  Following Commands: Follows one step commands with increased time;Follows one step commands with repetition  Attention Span: Attends with cues to redirect;Difficulty attending to directions  Memory: Decreased short term memory;Decreased recall of recent events  Safety Judgement: Decreased awareness of need for assistance;Decreased awareness of need for safety  Problem Solving: Assistance required to generate solutions;Decreased awareness of errors;Assistance required to implement solutions;Assistance required to identify errors made  Insights: Decreased awareness of deficits  Initiation: Requires cues for some  Sequencing: Requires cues for some  Cognition Comment: Pt with poor awareness to objects past midline on left side and pt attempting to hold walker in the middle with left hand instead of the L hand ; pt needing very heavy VC throughout session with all activity and for direction with mobility  Orientation  Overall Orientation Status: Impaired  Orientation Level: Oriented to place;Oriented to situation;Oriented to person;Disoriented to time (knew the month but not the year)    Functional Mobility  Transfers  Surface: From chair with arms;To chair with  arms  Additional Factors: Verbal cues (cueing for hand placement)  Device: Walker  Sit to Stand  Assistance Level: Contact guard assist;Minimal assistance (min A at times for L hand placement)  Skilled Clinical Factors: cueing for hand placement  Stand to Sit  Assistance Level: Contact guard assist (pt not putting her hands in appropriate place for transfers to sit)  Skilled Clinical Factors: cueing to line up to transfer surface appropriately      Environmental Mobility  Ambulation  Surface: Level surface  Device: Rolling walker  Distance: 200' (including up /down 75' ramp) and then short distances without AD in gym with min A, + x 120' to room with RW  Assistance Level: Minimal assistance  Skilled Clinical Factors: slow goyo, slightly forward flexed pattern, R knee extension with ambulation and stairs; pt noting she has a little soreness at the front of her knee but not a lot of pain and has full ROM, decreased foot clearance  Stairs  Stair Height: 6''  Device: One handrail  Number of Stairs: 12  Additional Factors: Verbal cues;Non-reciprocal going up;Reciprocal going down  Assistance Level: Contact guard assist  Skilled Clinical Factors: cueing for proper pattern and hand placement on the railing      Neuromuscular Education  Neuromuscular Education: Yes  Neuromuscular Comments: standing balance on foam; romberg stance with 2 HHA, 1 HHA, no HHA x 1 min each with CGA-min A for balance      PT Exercises  Exercise Treatment: Nustep x 10, Sit to stands x 10, standing marches with RW      ASSESSMENT/PROGRESS TOWARDS GOALS       Assessment  Assessment: Pt with impaired safety awareness, impaired L sided awareness/visual neglect, and impaired standing balance from baseline. She also demonstrates delayed processing. Pt keeping R LE stiff during mobility tasks including stairs. Pt requiring min A with transfers and ambulation with RW and would benefit from further IP PT to improve her independence and safety with

## 2023-12-30 NOTE — PROGRESS NOTES
Department of Physical Medicine & Rehabilitation  Progress Note    Patient Identification:  Halina Rae  [id number]  : 1945  Admit date: 2023    Chief Complaint: Acute cerebrovascular accident (CVA) (HCC)    Subjective:   No acute events overnight. Patient seen this am. She is working hard in therapy today. NO new pain overnight and she slept well. Improving daily in therapy. Today she is using the parallel bars in ambulation.      ROS: No f/c, n/v, cp    Objective:  Patient Vitals for the past 24 hrs:   BP Temp Temp src Pulse Resp SpO2   23 0815 117/74 97.5 °F (36.4 °C) Oral 60 18 94 %   23 1956 130/67 97.8 °F (36.6 °C) Oral 64 18 97 %   23 1801 136/71 -- -- 67 -- --     Const: Alert. No distress, pleasant.   HEENT: Normocephalic, atraumatic. Normal sclera/conjunctiva. MMM.   CV: Regular rate and rhythm.   Resp: No respiratory distress. Lungs CTAB.   Abd: Soft, nontender, nondistended, NABS+   Ext: No edema.   Neuro: Alert, oriented, appropriately interactive.   Psych: Cooperative, appropriate mood and affect    Laboratory data: Available via EMR.   Last 24 hour lab  No results found for this or any previous visit (from the past 24 hour(s)).    Therapy progress:  PT  Position Activity Restriction  Other position/activity restrictions: up as tolerated  Objective     Sit to Stand: Contact guard assistance, Minimal Assistance (Both VC/TC for hand/foot placement)  Stand to Sit: Contact guard assistance (VC for hand placement)  Bed to Chair: Contact guard assistance, Minimal assistance (w.RW)  Device: Rolling Walker  Assistance: Minimal assistance, Contact guard assistance  Distance: 100' 20'( includes amb up and down a 10'ramp) 60')  OT  PT Equipment Recommendations  Other: ongoing assessment at this time  Toilet - Technique: Ambulating  Equipment Used: Raised toilet seat with rails  Assessment        SLP          Body mass index is 18.79 kg/m².    Assessment and Plan:  1.  Acute

## 2023-12-30 NOTE — PROGRESS NOTES
ACUTE REHAB UNIT  SPEECH/LANGUAGE PATHOLOGY      [x] Daily  [] Weekly Care Conference Note  [] Discharge    Patient:Halina Rae      :1945  MRN:[id number]  Rehab Dx/Hx: CVA (cerebral vascular accident) ([facility]) [I63.9]  Acute cerebrovascular accident (CVA) ([facility]) [I63.9]    Precautions: [] Aspiration  [x] Fall risk  [] Sternal  [] Seizure [] Hip  [] Weight Bearing [] Other     ST Dx: [] Aphasia  [] Dysarthria  [] Apraxia   [] Oropharyngeal dysphagia [] Cognitive Impairment  [] Other:   Date of Admit: 2023  Room #: 3105/3105-01  Date: 2023          Current Diet Order:ADULT DIET; Regular  ADULT ORAL NUTRITION SUPPLEMENT; Dinner; Frozen Oral Supplement                     Lives With: Daughter     Education: 11th grade education  Occupation: Part time employment  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 Needs consistent cues to attend past midline and shows very poor awareness to objects on L side)  Hearing  Hearing: Exceptions to WFL  Hearing Exceptions: Hard of hearing/hearing concerns (needs things repeated at times)  Barriers toward progress: Cognitive deficit        Date: 2023      Tx session 1 Tx session 2   Total Timed Code Min 30 30   Total Treatment Minutes 30 30   Individual Treatment Minutes 30 30   Group Treatment Minutes 0 0   Co-Treat Minutes 0 0   Brief Exception: N/A N/A   Pain None indicated None indicated   Pain Intervention: [] RN notified  [] Repositioned  [] Intervention offered and patient declined  [x] N/A  [] Other: [] RN notified  [] Repositioned  [] Intervention offered and patient declined  [] N/A  [] Other:   Subjective:     Pt upright in chair upon entry with PCA at side. Agreeable to tx session.  Pt upright in chair upon entry. Agreeable to tx session. PCA at side.    Objective / Goals:    assistance at home upon discharge  [] Other      Electronically signed by  Magy Parsons MA, CCC-SLP  [id number]  Speech-Language Pathologist

## 2023-12-31 PROCEDURE — 6360000002 HC RX W HCPCS: Performed by: PHYSICAL MEDICINE & REHABILITATION

## 2023-12-31 PROCEDURE — 97110 THERAPEUTIC EXERCISES: CPT

## 2023-12-31 PROCEDURE — 1280000000 HC REHAB R&B

## 2023-12-31 PROCEDURE — 6370000000 HC RX 637 (ALT 250 FOR IP): Performed by: PHYSICAL MEDICINE & REHABILITATION

## 2023-12-31 PROCEDURE — 97129 THER IVNTJ 1ST 15 MIN: CPT

## 2023-12-31 PROCEDURE — 97530 THERAPEUTIC ACTIVITIES: CPT

## 2023-12-31 PROCEDURE — 97116 GAIT TRAINING THERAPY: CPT

## 2023-12-31 PROCEDURE — 97130 THER IVNTJ EA ADDL 15 MIN: CPT

## 2023-12-31 RX ADMIN — ATORVASTATIN CALCIUM 80 MG: 80 TABLET, FILM COATED ORAL at 19:36

## 2023-12-31 RX ADMIN — CARVEDILOL 25 MG: 12.5 TABLET, FILM COATED ORAL at 08:11

## 2023-12-31 RX ADMIN — ACETAMINOPHEN 650 MG: 325 TABLET ORAL at 19:36

## 2023-12-31 RX ADMIN — CARVEDILOL 25 MG: 12.5 TABLET, FILM COATED ORAL at 17:10

## 2023-12-31 RX ADMIN — ASPIRIN 81 MG: 81 TABLET, CHEWABLE ORAL at 08:12

## 2023-12-31 RX ADMIN — ESCITALOPRAM 5 MG: 5 TABLET, FILM COATED ORAL at 08:11

## 2023-12-31 RX ADMIN — PANTOPRAZOLE SODIUM 40 MG: 40 TABLET, DELAYED RELEASE ORAL at 05:43

## 2023-12-31 RX ADMIN — ENOXAPARIN SODIUM 40 MG: 100 INJECTION SUBCUTANEOUS at 08:11

## 2023-12-31 RX ADMIN — ONDANSETRON 4 MG: 4 TABLET, ORALLY DISINTEGRATING ORAL at 19:36

## 2023-12-31 RX ADMIN — LOSARTAN POTASSIUM 25 MG: 25 TABLET, FILM COATED ORAL at 08:11

## 2023-12-31 ASSESSMENT — PAIN SCALES - GENERAL
PAINLEVEL_OUTOF10: 3
PAINLEVEL_OUTOF10: 0
PAINLEVEL_OUTOF10: 1

## 2023-12-31 ASSESSMENT — PAIN DESCRIPTION - PAIN TYPE: TYPE: ACUTE PAIN

## 2023-12-31 ASSESSMENT — PAIN DESCRIPTION - ONSET: ONSET: GRADUAL

## 2023-12-31 ASSESSMENT — PAIN DESCRIPTION - DIRECTION: RADIATING_TOWARDS: HEAD

## 2023-12-31 ASSESSMENT — PAIN - FUNCTIONAL ASSESSMENT: PAIN_FUNCTIONAL_ASSESSMENT: ACTIVITIES ARE NOT PREVENTED

## 2023-12-31 ASSESSMENT — PAIN DESCRIPTION - DESCRIPTORS: DESCRIPTORS: ACHING;DISCOMFORT

## 2023-12-31 ASSESSMENT — PAIN DESCRIPTION - FREQUENCY: FREQUENCY: INTERMITTENT

## 2023-12-31 ASSESSMENT — PAIN DESCRIPTION - ORIENTATION: ORIENTATION: RIGHT;LEFT

## 2023-12-31 ASSESSMENT — PAIN DESCRIPTION - LOCATION: LOCATION: HEAD

## 2023-12-31 NOTE — PLAN OF CARE
Problem: Discharge Planning  Goal: Discharge to home or other facility with appropriate resources  Outcome: Progressing  Discharge to home or other facility with appropriate resources: Identify barriers to discharge with patient and caregiver     Problem: Safety - Adult  Goal: Free from fall injury  Outcome: Progressing     Problem: Skin/Tissue Integrity  Goal: Absence of new skin breakdown  Description: 1.  Monitor for areas of redness and/or skin breakdown  2.  Assess vascular access sites hourly  3.  Every 4-6 hours minimum:  Change oxygen saturation probe site  4.  Every 4-6 hours:  If on nasal continuous positive airway pressure, respiratory therapy assess nares and determine need for appliance change or resting period.  Outcome: Progressing     Problem: ABCDS Injury Assessment  Goal: Absence of physical injury  Outcome: Progressing     Problem: Nutrition Deficit:  Goal: Optimize nutritional status  Outcome: Progressing     Problem: Pain  Goal: Verbalizes/displays adequate comfort level or baseline comfort level  Outcome: Progressing  Verbalizes/displays adequate comfort level or baseline comfort level: Encourage patient to monitor pain and request assistance
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
## 2023-12-31 NOTE — PROGRESS NOTES
Physical Therapy  Facility/Department: [facility] ACUTE REHAB UNIT  Rehabilitation Physical Therapy Treatment Note    NAME: Halina Rae  : 1945 (78 y.o.)  MRN: [id number]  CODE STATUS: Full Code    Date of Service: 23       Restrictions:  Restrictions/Precautions: Up as Tolerated, Fall Risk  Position Activity Restriction  Other position/activity restrictions: up as tolerated     SUBJECTIVE  Subjective  Subjective: Pt sitting in chair alert and agreeable to session  Pain: Noc/o pain throughout session        Post Treatment Pain Screening         OBJECTIVE  Cognition  Overall Cognitive Status: Exceptions  Arousal/Alertness: Delayed responses to stimuli  Following Commands: Follows one step commands with increased time;Follows one step commands with repetition  Attention Span: Attends with cues to redirect;Difficulty attending to directions  Memory: Decreased short term memory;Decreased recall of recent events  Safety Judgement: Decreased awareness of need for assistance;Decreased awareness of need for safety  Problem Solving: Assistance required to generate solutions;Decreased awareness of errors;Assistance required to implement solutions;Assistance required to identify errors made  Insights: Decreased awareness of deficits  Initiation: Requires cues for some  Sequencing: Requires cues for some  Cognition Comment: Pt with poor awareness to objects past midline on left side and pt attempting to hold walker in the middle with left hand instead of the L hand ; pt needing very heavy VC throughout session with all activity and for direction with mobility  Orientation  Overall Orientation Status: Impaired  Orientation Level: Oriented to place;Oriented to situation;Oriented to person;Disoriented to time (knew the month but not the year)    Functional Mobility  Bed Mobility  Overall Assistance Level: Supervision  Sit to Supine  Assistance Level: Supervision  Skilled Clinical Factors: on flat mat table no rail ,

## 2023-12-31 NOTE — PROGRESS NOTES
Department of Physical Medicine & Rehabilitation  Progress Note    Patient Identification:  Halina Rae  [id number]  : 1945  Admit date: 2023    Chief Complaint: Acute cerebrovascular accident (CVA) (HCC)    Subjective:   Seen in therapy this morning. No new complaints overnight. She is working hard in each therapy session. She is using a walker to get down the burden. NO new pain overnight. She is hoping to go home this week.      ROS: No f/c, n/v, cp    Objective:  Patient Vitals for the past 24 hrs:   BP Temp Temp src Pulse Resp SpO2   23 0800 118/74 98 °F (36.7 °C) Oral 60 18 98 %   23 -- -- -- 61 16 --   23 -- -- -- 60 16 --   23 1945 118/72 98 °F (36.7 °C) Oral 62 16 97 %   23 1730 132/66 97.6 °F (36.4 °C) Oral 59 16 99 %       Const: Alert. No distress, pleasant.   HEENT: Normocephalic, atraumatic. Normal sclera/conjunctiva. MMM.   CV: Regular rate and rhythm.   Resp: No respiratory distress. Lungs CTAB.   Abd: Soft, nontender, nondistended, NABS+   Ext: No edema.   Neuro: Alert, oriented, appropriately interactive.   Psych: Cooperative, appropriate mood and affect    Laboratory data: Available via EMR.   Last 24 hour lab  No results found for this or any previous visit (from the past 24 hour(s)).    Therapy progress:  PT  Position Activity Restriction  Other position/activity restrictions: up as tolerated  Objective     Sit to Stand: Contact guard assistance, Minimal Assistance (Both VC/TC for hand/foot placement)  Stand to Sit: Contact guard assistance (VC for hand placement)  Bed to Chair: Contact guard assistance, Minimal assistance (w.RW)  Device: Rolling Walker  Assistance: Minimal assistance, Contact guard assistance  Distance: 100' 20'( includes amb up and down a 10'ramp) 60')  OT  PT Equipment Recommendations  Other: ongoing assessment at this time  Toilet - Technique: Ambulating  Equipment Used: Raised toilet seat with rails  Assessment         SLP          Body mass index is 18.79 kg/m².    Assessment and Plan:  1.  Acute thromboembolic right MCA stroke with gait instability              -Gait improving  - ASA  -lipitor  2.  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  Possible underlying paroxysmal A-fib              -coreg              - cozaar  4.  Hypertension  -monitor  5.  Depression  -lexapro 5mg     Rehab Progress: Improving  Anticipated Dispo: home  Services/DME: JUAN  ELOS: JUAN Weaver D.O. M.P.H  PM&R  12/31/2023  11:06 AM

## 2023-12-31 NOTE — PROGRESS NOTES
Money: completed 1/5 problems correctly in 27 minutes. Errors attributed to reduced working memory, perseverations, reduced self-monitoring, reduced error awareness, and reduced organization. This correlates with an independent function rating of 3 which suggests a high probability of inability to function independently with this type of task.         Other areas targeted:     Education:   Educated to rationale for tx session and skills targeted. Also educated to goals.  Educated to rationale for tx session and skills targeted.    Safety Devices: [x] Call light within reach  [x] Chair alarm activated and connected to nurse call light system  [] Bed alarm activated   [x] Other: pt able to verbalize call light policy back to SLP.  [x] Call light within reach  [x] Chair alarm activated and connected to nurse call light system  [] Bed alarm activated  [x] Other: pt verbalized call light policy back to SLP, PCA at side   Assessment: Moderate-severe cognitive-linguistic impairment. Severe L sided visual inattention and some impulsivity. Pt also with reduced recall.    Plan: Continue as per plan of care.      Interventions used this date:  [] Speech/Language Treatment  [] Instruction in HEP  [] Dysphagia Treatment [x] Cognitive Treatment   [] Other:    Discharge recommendations:  [] Home independently  [] Home with assistance [x]  24 hour supervision  [] ECF [] Other  Continued Tx Upon Discharge: ? [x] Yes    [] No    [] TBD based on progress while on ARU     [] Vital Stim indicated     [] Other:   Estimated discharge date: Not yet established    Barriers to home discharge:  [x] inability to independently manage medications, will need assist/supervision  [x] inability to independently manage finances, will need assist/supervision  [] inability to effectively communicate in emergent situations (ex: calling 911, stating name, reduced intelligibility, etc)  [] Inability to communicate or demonstrate problem solving due to  cognitive-communicative impairment  [x] severity of cognition (mild, mod, severe) with reduced insight negatively impacting safety/independence  [] inability to recall and utilize swallow strategies placing pt at risk for aspiration  [] inability to recall and/or comprehend diet recommendations regarding least restrictive diet  [] limited assistance at home upon discharge  [] Other      Electronically signed by  Magy Parsons MA, CCC-SLP  [id number]  Speech-Language Pathologist

## 2024-01-01 LAB
ANION GAP SERPL CALCULATED.3IONS-SCNC: 7 MMOL/L (ref 3–16)
BASOPHILS # BLD: 0 K/UL (ref 0–0.2)
BASOPHILS NFR BLD: 1 %
BUN SERPL-MCNC: 13 MG/DL (ref 7–20)
CALCIUM SERPL-MCNC: 9.6 MG/DL (ref 8.3–10.6)
CHLORIDE SERPL-SCNC: 104 MMOL/L (ref 99–110)
CO2 SERPL-SCNC: 30 MMOL/L (ref 21–32)
CREAT SERPL-MCNC: 0.7 MG/DL (ref 0.6–1.2)
DEPRECATED RDW RBC AUTO: 13.8 % (ref 12.4–15.4)
EOSINOPHIL # BLD: 0.1 K/UL (ref 0–0.6)
EOSINOPHIL NFR BLD: 2 %
GFR SERPLBLD CREATININE-BSD FMLA CKD-EPI: >60 ML/MIN/{1.73_M2}
GLUCOSE SERPL-MCNC: 90 MG/DL (ref 70–99)
HCT VFR BLD AUTO: 34.9 % (ref 36–48)
HGB BLD-MCNC: 11.7 G/DL (ref 12–16)
LYMPHOCYTES # BLD: 1 K/UL (ref 1–5.1)
LYMPHOCYTES NFR BLD: 25.2 %
MCH RBC QN AUTO: 28.7 PG (ref 26–34)
MCHC RBC AUTO-ENTMCNC: 33.5 G/DL (ref 31–36)
MCV RBC AUTO: 85.7 FL (ref 80–100)
MONOCYTES # BLD: 0.5 K/UL (ref 0–1.3)
MONOCYTES NFR BLD: 11.6 %
NEUTROPHILS # BLD: 2.5 K/UL (ref 1.7–7.7)
NEUTROPHILS NFR BLD: 60.2 %
PLATELET # BLD AUTO: 156 K/UL (ref 135–450)
PMV BLD AUTO: 9.5 FL (ref 5–10.5)
POTASSIUM SERPL-SCNC: 3.9 MMOL/L (ref 3.5–5.1)
RBC # BLD AUTO: 4.07 M/UL (ref 4–5.2)
SODIUM SERPL-SCNC: 141 MMOL/L (ref 136–145)
WBC # BLD AUTO: 4.1 K/UL (ref 4–11)

## 2024-01-01 PROCEDURE — 1280000000 HC REHAB R&B

## 2024-01-01 PROCEDURE — 6370000000 HC RX 637 (ALT 250 FOR IP): Performed by: PHYSICAL MEDICINE & REHABILITATION

## 2024-01-01 PROCEDURE — 6360000002 HC RX W HCPCS: Performed by: PHYSICAL MEDICINE & REHABILITATION

## 2024-01-01 PROCEDURE — 85025 COMPLETE CBC W/AUTO DIFF WBC: CPT

## 2024-01-01 PROCEDURE — 36415 COLL VENOUS BLD VENIPUNCTURE: CPT

## 2024-01-01 PROCEDURE — 80048 BASIC METABOLIC PNL TOTAL CA: CPT

## 2024-01-01 RX ADMIN — ASPIRIN 81 MG: 81 TABLET, CHEWABLE ORAL at 08:22

## 2024-01-01 RX ADMIN — ATORVASTATIN CALCIUM 80 MG: 80 TABLET, FILM COATED ORAL at 19:17

## 2024-01-01 RX ADMIN — ENOXAPARIN SODIUM 40 MG: 100 INJECTION SUBCUTANEOUS at 08:22

## 2024-01-01 RX ADMIN — CARVEDILOL 25 MG: 12.5 TABLET, FILM COATED ORAL at 17:08

## 2024-01-01 RX ADMIN — ONDANSETRON 4 MG: 4 TABLET, ORALLY DISINTEGRATING ORAL at 19:17

## 2024-01-01 RX ADMIN — LOSARTAN POTASSIUM 25 MG: 25 TABLET, FILM COATED ORAL at 08:22

## 2024-01-01 RX ADMIN — ESCITALOPRAM 5 MG: 5 TABLET, FILM COATED ORAL at 08:22

## 2024-01-01 RX ADMIN — ACETAMINOPHEN 650 MG: 325 TABLET ORAL at 19:17

## 2024-01-01 RX ADMIN — PANTOPRAZOLE SODIUM 40 MG: 40 TABLET, DELAYED RELEASE ORAL at 05:11

## 2024-01-01 RX ADMIN — CARVEDILOL 25 MG: 12.5 TABLET, FILM COATED ORAL at 08:22

## 2024-01-01 ASSESSMENT — PAIN SCALES - GENERAL
PAINLEVEL_OUTOF10: 3
PAINLEVEL_OUTOF10: 1
PAINLEVEL_OUTOF10: 0

## 2024-01-01 ASSESSMENT — PAIN - FUNCTIONAL ASSESSMENT: PAIN_FUNCTIONAL_ASSESSMENT: ACTIVITIES ARE NOT PREVENTED

## 2024-01-01 ASSESSMENT — PAIN DESCRIPTION - PAIN TYPE: TYPE: ACUTE PAIN

## 2024-01-01 ASSESSMENT — PAIN DESCRIPTION - DESCRIPTORS: DESCRIPTORS: ACHING;DISCOMFORT

## 2024-01-01 ASSESSMENT — PAIN DESCRIPTION - FREQUENCY: FREQUENCY: INTERMITTENT

## 2024-01-01 ASSESSMENT — PAIN DESCRIPTION - ORIENTATION: ORIENTATION: RIGHT;LEFT

## 2024-01-01 ASSESSMENT — PAIN DESCRIPTION - ONSET: ONSET: GRADUAL

## 2024-01-01 ASSESSMENT — PAIN DESCRIPTION - DIRECTION: RADIATING_TOWARDS: GENERALIZED

## 2024-01-01 ASSESSMENT — PAIN DESCRIPTION - LOCATION: LOCATION: GENERALIZED

## 2024-01-01 NOTE — PROGRESS NOTES
26.0 - 34.0 pg    MCHC 33.5 31.0 - 36.0 g/dL    RDW 13.8 12.4 - 15.4 %    Platelets 156 135 - 450 K/uL    MPV 9.5 5.0 - 10.5 fL    Neutrophils % 60.2 %    Lymphocytes % 25.2 %    Monocytes % 11.6 %    Eosinophils % 2.0 %    Basophils % 1.0 %    Neutrophils Absolute 2.5 1.7 - 7.7 K/uL    Lymphocytes Absolute 1.0 1.0 - 5.1 K/uL    Monocytes Absolute 0.5 0.0 - 1.3 K/uL    Eosinophils Absolute 0.1 0.0 - 0.6 K/uL    Basophils Absolute 0.0 0.0 - 0.2 K/uL       Therapy progress:  PT  Position Activity Restriction  Other position/activity restrictions: up as tolerated  Objective     Sit to Stand: Contact guard assistance, Minimal Assistance (Both VC/TC for hand/foot placement)  Stand to Sit: Contact guard assistance (VC for hand placement)  Bed to Chair: Contact guard assistance, Minimal assistance (w.RW)  Device: Rolling Walker  Assistance: Minimal assistance, Contact guard assistance  Distance: 100' 20'( includes amb up and down a 10'ramp) 60')  OT  PT Equipment Recommendations  Equipment Needed: Yes  Mobility Devices: Walker  Walker: Rolling  Other: ongoing assessment at this time  Toilet - Technique: Ambulating  Equipment Used: Raised toilet seat with rails  Assessment        SLP          Body mass index is 18.79 kg/m².    Assessment and Plan:  1.  Acute thromboembolic right MCA stroke with gait instability              -Gait improving  - ASA  -lipitor  2.  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  Possible underlying paroxysmal A-fib              -coreg              - cozaar  4.  Hypertension  -monitor  5.  Depression  -lexapro 5mg     Rehab Progress: Improving  Anticipated Dispo: home  Services/DME: [facility]  ELOS: JUAN Weaver D.O. M.P.H  PM&R  1/1/2024  12:15 PM

## 2024-01-01 NOTE — PLAN OF CARE
Problem: Discharge Planning  Goal: Discharge to home or other facility with appropriate resources  Outcome: Progressing     Problem: Safety - Adult  Goal: Free from fall injury  Outcome: Progressing     Problem: Skin/Tissue Integrity  Goal: Absence of new skin breakdown  Description: 1.  Monitor for areas of redness and/or skin breakdown  2.  Assess vascular access sites hourly  3.  Every 4-6 hours minimum:  Change oxygen saturation probe site  4.  Every 4-6 hours:  If on nasal continuous positive airway pressure, respiratory therapy assess nares and determine need for appliance change or resting period.  Outcome: Progressing     Problem: ABCDS Injury Assessment  Goal: Absence of physical injury  Outcome: Progressing     Problem: Nutrition Deficit:  Goal: Optimize nutritional status  Outcome: Progressing     Problem: Pain  Goal: Verbalizes/displays adequate comfort level or baseline comfort level  Outcome: Progressing  Verbalizes/displays adequate comfort level or baseline comfort level: Encourage patient to monitor pain and request assistance

## 2024-01-01 NOTE — PATIENT CARE CONFERENCE
Inpatient Rehabilitation  Weekly Team Conference Note  The [address] [place] [address]  [phone]  Patient Name: Halina Rae        MRN: [id number]    : 1945  (78 y.o.)  Gender: female   Referring Practitioner: Dr. Gerald Weaver  Diagnosis: CVA w/ L hemiparesis  The team conference for this patient was held on 2024 at 11:00am by:  Gerald Weaver DO    Current/Goal QM SCORES  QM Current/Goal Score   Eating CARE Score: 5 / Discharge Goal: Independent   Oral Hygiene CARE Score: 4 / Discharge Goal: Independent   Shower/Bathing CARE Score: 4 / Discharge Goal: Supervision or touching assistance   UB Dressing CARE Score: 4 / Discharge Goal: Independent   LB Dressing CARE Score: 4 / Discharge Goal: Independent   Putting on/off Footwear CARE Score: 4 / Discharge Goal: Independent   Toileting Hygiene CARE Score: 4 / Discharge Goal: Independent   Bladder Continence Bladder Continence: Stress incontinence only    Bowel Continence Bowel Continence: Occassionally incontinent    Toilet Transfers CARE Score: 4 / Discharge Goal: Independent   Shower/Bathe Self  CARE Score: 4 / Discharge Goal: Supervision or touching assistance   Rolling Left and Right CARE Score: 4 / Discharge Goal: Independent   Sit to Lying CARE Score: 4 / Discharge Goal: Independent   Lying to Sitting on Bedside CARE Score: 4 / Discharge Goal: Independent   Sit to Stand CARE Score: 3 / Discharge Goal: Independent   Chair/Bed to Chair Transfer CARE Score: 3 / Discharge Goal: Independent   Car Transfers CARE Score: 3 / Discharge Goal: Supervision or touching assistance   Walk 10 Feet CARE Score: 3 / Discharge Goal: Independent   Walk 50 Feet with Two Turns CARE Score: 3 / Discharge Goal: Independent   Walk 150 Feet CARE Score: 88 / Discharge Goal: Independent   Walk 10 Feet on Uneven Surfaces CARE Score: 3 / Discharge Goal: Independent   1 Step (Curb) CARE Score: 3 / Discharge Goal: Independent  myself\"  2. Return home at discharge     Rehab Team Goals for patient for the Upcoming Week:  1. increase independence with transfers and mobility  2. Increase independence with ADLs  3. Improve left sided awareness with compensatory strategies    Barriers to Progress/Attainment of Goals & Efforts/Interventions to remove Barriers:  1. left visual neglect/field cut  - cont therapy and education  2. L-sided weakness - cont therapy  3. Decreased awareness of deficits     Discharge Plan:  Estimated Length of Stay: 12 days  Destination: home health  Services at Discharge: Home Health  Physical Therapy, Occupational Therapy, Speech Therapy, and Nursing 3x week  Equipment at Discharge:  RW, cont to assess OT (likely shower chair and possibly 3-in-1 commode)  Community Resources:   Factors facilitating achievement of predicted outcomes: Cooperative  Barriers to the achievement of predicted outcomes: Limited family support, Confusion, Cognitive deficit, Upper extremity weakness, Lower extremity weakness, and Impaired vision    Special Needs in the Upcoming Week:   [x] Family/Caregiver Education  [] Home visit  []Therapeutic Pass [] Consults:_______   [] Family/Caregiver Training  [] Stroke Risk Factor Education     [] Other;_______     TEAM SUMMARY: Will continue with current poc & goals until anticipated d/c date of 1/9/2023.    MD:   Stroke Risk Factors:   [] N/A for this patient [] HTN  []  Diabetes  [] Hyperlipidemia  []Obesity BMI >25  [] Atrial Fibrillation [] Smoker (current)  [] Smoker (quit in last 12 months)  [] Sleep Apnea [] Other:     Risk for Readmission: Moderate (10-19)    Justification for Continued Stay:   Criteria for continued IRF stay:  Based on my medical assessment of the patient and review of information from the interdisciplinary team, as part of this weekly team conference, the patient continues to meet the following criteria for IRF level of care:  [x] The patient requires 24-hour rehabilitation

## 2024-01-02 PROCEDURE — 97116 GAIT TRAINING THERAPY: CPT | Performed by: PHYSICAL THERAPIST

## 2024-01-02 PROCEDURE — 97535 SELF CARE MNGMENT TRAINING: CPT

## 2024-01-02 PROCEDURE — 97530 THERAPEUTIC ACTIVITIES: CPT | Performed by: PHYSICAL THERAPIST

## 2024-01-02 PROCEDURE — 97130 THER IVNTJ EA ADDL 15 MIN: CPT

## 2024-01-02 PROCEDURE — 97530 THERAPEUTIC ACTIVITIES: CPT

## 2024-01-02 PROCEDURE — 6370000000 HC RX 637 (ALT 250 FOR IP): Performed by: PHYSICAL MEDICINE & REHABILITATION

## 2024-01-02 PROCEDURE — 1280000000 HC REHAB R&B

## 2024-01-02 PROCEDURE — 97129 THER IVNTJ 1ST 15 MIN: CPT

## 2024-01-02 PROCEDURE — 6360000002 HC RX W HCPCS: Performed by: PHYSICAL MEDICINE & REHABILITATION

## 2024-01-02 RX ADMIN — CARVEDILOL 25 MG: 12.5 TABLET, FILM COATED ORAL at 08:34

## 2024-01-02 RX ADMIN — ATORVASTATIN CALCIUM 80 MG: 80 TABLET, FILM COATED ORAL at 20:17

## 2024-01-02 RX ADMIN — CARVEDILOL 25 MG: 12.5 TABLET, FILM COATED ORAL at 17:04

## 2024-01-02 RX ADMIN — ASPIRIN 81 MG: 81 TABLET, CHEWABLE ORAL at 08:35

## 2024-01-02 RX ADMIN — PANTOPRAZOLE SODIUM 40 MG: 40 TABLET, DELAYED RELEASE ORAL at 05:52

## 2024-01-02 RX ADMIN — ENOXAPARIN SODIUM 40 MG: 100 INJECTION SUBCUTANEOUS at 08:34

## 2024-01-02 RX ADMIN — ESCITALOPRAM 5 MG: 5 TABLET, FILM COATED ORAL at 08:34

## 2024-01-02 RX ADMIN — LOSARTAN POTASSIUM 25 MG: 25 TABLET, FILM COATED ORAL at 08:34

## 2024-01-02 ASSESSMENT — PAIN SCALES - WONG BAKER: WONGBAKER_NUMERICALRESPONSE: 0

## 2024-01-02 NOTE — PROGRESS NOTES
ACUTE REHAB UNIT  SPEECH/LANGUAGE PATHOLOGY      [x] Daily  [x] Weekly Care Conference Note  [] Discharge    Patient:Halina Rae      :1945  MRN:[id number]  Rehab Dx/Hx: CVA (cerebral vascular accident) ([facility]) [I63.9]  Acute cerebrovascular accident (CVA) (HCC) [I63.9]    Precautions: [] Aspiration  [x] Fall risk  [] Sternal  [] Seizure [] Hip  [] Weight Bearing [] Other     ST Dx: [] Aphasia  [] Dysarthria  [] Apraxia   [] Oropharyngeal dysphagia [x] Cognitive Impairment  [] Other:   Date of Admit: 2023  Room #: 3105/3105-01  Date: 2024          Current Diet Order:ADULT DIET; Regular  ADULT ORAL NUTRITION SUPPLEMENT; Dinner; Frozen Oral Supplement   Lives With: Daughter  Education: 11th grade education  Occupation: Part time employment  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 Needs consistent cues to attend past midline and shows very poor awareness to objects on L side)  Hearing  Hearing: Exceptions to WFL  Hearing Exceptions: Hard of hearing/hearing concerns (needs things repeated at times)  Barriers toward progress: Cognitive deficit        Date: 2024        Tx session 1 Tx session 2 Tx session 3 Weekly Summary   Total Timed Code Min 25 30 30    Total Treatment Minutes 25 30 30    Individual Treatment Minutes 25 30 30    Group Treatment Minutes 0 0 0    Co-Treat Minutes 0 0 0    Brief Exception: N/A N/A N/A    Pain None indicated None indicated None indicated    Pain Intervention: [] RN notified  [] Repositioned  [] Intervention offered and patient declined  [x] N/A  [] Other: [] RN notified  [] Repositioned  [] Intervention offered and patient declined  [] N/A  [] Other: [] RN notified  [] Repositioned  [] Intervention offered and patient declined  [x] N/A  [] Other:    Subjective:     Pt upright in chair upon

## 2024-01-02 NOTE — PLAN OF CARE
Problem: Discharge Planning  Goal: Discharge to home or other facility with appropriate resources  1/2/2024 1002 by Meagan Arzola RN  Outcome: Progressing     Problem: Safety - Adult  Goal: Free from fall injury  1/2/2024 1002 by Meagan Arzola RN  Outcome: Progressing     Problem: Skin/Tissue Integrity  Goal: Absence of new skin breakdown  Description: 1.  Monitor for areas of redness and/or skin breakdown  2.  Assess vascular access sites hourly  3.  Every 4-6 hours minimum:  Change oxygen saturation probe site  4.  Every 4-6 hours:  If on nasal continuous positive airway pressure, respiratory therapy assess nares and determine need for appliance change or resting period.  1/2/2024 1002 by Meagan Arzola RN  Outcome: Progressing     Problem: ABCDS Injury Assessment  Goal: Absence of physical injury  1/2/2024 1002 by Meagan Arzola RN  Outcome: Progressing     Problem: Nutrition Deficit:  Goal: Optimize nutritional status  1/2/2024 1002 by Meagan Arzola RN  Outcome: Progressing     Problem: Pain  Goal: Verbalizes/displays adequate comfort level or baseline comfort level  1/2/2024 1002 by Meagan Arzola RN  Outcome: Progressing

## 2024-01-02 NOTE — PROGRESS NOTES
Shift assessment complete. VSS. A&Ox4, confused at times. Denies pain at this time. Fall precautions in place. Patient up to chair for breakfast, chair alarm utilized, call light within reach. Patient with no new complaints at this time. Patient reports that she is sleeping well at night. Patient needs direction provided throughout transfers and meals set up.     Vitals:    01/02/24 0831   BP: (!) 145/75   Pulse: 60   Resp: 16   Temp: 97.5 °F (36.4 °C)   SpO2: 97%

## 2024-01-02 NOTE — CARE COORDINATION
SW met with patient at bedside to provide updates and give anticipated discharge date of 1/9/24. Patient was agreeable with anticipated discharge date and has no more questions at this time. Patient is from home with her family and noted that her family will provide care at discharge. SW following.      Electronically signed by JAC Cooper on 1/2/2024 at 6:42 PM

## 2024-01-02 NOTE — PLAN OF CARE
Problem: Discharge Planning  Goal: Discharge to home or other facility with appropriate resources  Outcome: Progressing  Discharge to home or other facility with appropriate resources:   Identify barriers to discharge with patient and caregiver   Arrange for needed discharge resources and transportation as appropriate     Problem: Safety - Adult  Goal: Free from fall injury  Outcome: Progressing     Problem: Skin/Tissue Integrity  Goal: Absence of new skin breakdown  Description: 1.  Monitor for areas of redness and/or skin breakdown  2.  Assess vascular access sites hourly  3.  Every 4-6 hours minimum:  Change oxygen saturation probe site  4.  Every 4-6 hours:  If on nasal continuous positive airway pressure, respiratory therapy assess nares and determine need for appliance change or resting period.  Outcome: Progressing     Problem: ABCDS Injury Assessment  Goal: Absence of physical injury  Outcome: Progressing     Problem: Nutrition Deficit:  Goal: Optimize nutritional status  Outcome: Progressing     Problem: Pain  Goal: Verbalizes/displays adequate comfort level or baseline comfort level  Outcome: Progressing  Verbalizes/displays adequate comfort level or baseline comfort level: Encourage patient to monitor pain and request assistance

## 2024-01-02 NOTE — PROGRESS NOTES
Physical Therapy  Facility/Department: [facility] ACUTE REHAB UNIT  Rehabilitation Physical Therapy Treatment Note    NAME: Halina Rae  : 1945 (78 y.o.)  MRN: [id number]  CODE STATUS: Full Code    Date of Service: 24       Restrictions:  Restrictions/Precautions: Up as Tolerated, Fall Risk  Position Activity Restriction  Other position/activity restrictions: up as tolerated     SUBJECTIVE  Subjective  Subjective: Pt sitting up in bed  when PT arrived.Pt agreeable to therapy  Pain: No c/o pain throughout session        Post Treatment Pain Screening         OBJECTIVE  Cognition  Overall Cognitive Status: Exceptions  Arousal/Alertness: Delayed responses to stimuli  Following Commands: Follows one step commands with increased time;Follows one step commands with repetition  Attention Span: Attends with cues to redirect;Difficulty attending to directions  Memory: Decreased short term memory;Decreased recall of recent events  Safety Judgement: Decreased awareness of need for assistance;Decreased awareness of need for safety  Problem Solving: Assistance required to generate solutions;Decreased awareness of errors;Assistance required to implement solutions;Assistance required to identify errors made  Insights: Decreased awareness of deficits  Initiation: Requires cues for some  Sequencing: Requires cues for some  Cognition Comment: Decreased motor planning, sequencing, initiation of tasks. Poor awareness of L side despite pt using the L side functionally with VC/ TC  Orientation  Overall Orientation Status: Impaired  Orientation Level: Oriented to place;Oriented to situation;Oriented to person;Disoriented to time    Functional Mobility  Balance  Sitting Balance: Supervision  Standing Balance: Contact guard assistance  Transfers  Surface: From chair with arms;To chair with arms;To mat;From mat  Sit to Stand  Assistance Level: Stand by assist  Skilled Clinical Factors: Still req VC for sequencing the transf including

## 2024-01-02 NOTE — PLAN OF CARE
Problem: Discharge Planning  Goal: Discharge to home or other facility with appropriate resources  Outcome: Progressing   Identify barriers to discharge with patient and caregiver   Arrange for needed discharge resources and transportation as appropriate     Problem: Safety - Adult  Goal: Free from fall injury  Outcome: Progressing     Problem: Skin/Tissue Integrity  Goal: Absence of new skin breakdown  Description: 1.  Monitor for areas of redness and/or skin breakdown  2.  Assess vascular access sites hourly  3.  Every 4-6 hours minimum:  Change oxygen saturation probe site  4.  Every 4-6 hours:  If on nasal continuous positive airway pressure, respiratory therapy assess nares and determine need for appliance change or resting period.  Outcome: Progressing     Problem: ABCDS Injury Assessment  Goal: Absence of physical injury  Outcome: Progressing     Problem: Nutrition Deficit:  Goal: Optimize nutritional status  Outcome: Progressing     Problem: Pain  Goal: Verbalizes/displays adequate comfort level or baseline comfort level  Outcome: Progressing  Flowsheets  Verbalizes/displays adequate comfort level or baseline comfort level: Encourage patient to monitor pain and request assistance

## 2024-01-02 NOTE — PROGRESS NOTES
Department of Physical Medicine & Rehabilitation  Progress Note    Patient Identification:  Halina Rae  [id number]  : 1945  Admit date: 2023    Chief Complaint: Acute cerebrovascular accident (CVA) (HCC)    Subjective:   Seen in her room this morning. No new complaints overnight. She is working hard in therapy. Slept well overnight. She is feeling like she is walking better and is making improvements to go home.   Team conference today.    ROS: No f/c, n/v, cp    Objective:  Patient Vitals for the past 24 hrs:   BP Temp Temp src Pulse Resp SpO2   24 0831 (!) 145/75 97.5 °F (36.4 °C) Oral 60 16 97 %   24 194 -- -- -- 62 18 --   24 1917 118/68 98.1 °F (36.7 °C) Oral 61 18 98 %   24 1700 133/67 -- -- 62 -- --       Const: Alert. No distress, pleasant.   HEENT: Normocephalic, atraumatic. Normal sclera/conjunctiva. MMM.   CV: Regular rate and rhythm.   Resp: No respiratory distress. Lungs CTAB.   Abd: Soft, nontender, nondistended, NABS+   Ext: No edema.   Neuro: Alert, oriented, appropriately interactive.   Psych: Cooperative, appropriate mood and affect    Laboratory data: Available via EMR.   Last 24 hour lab  No results found for this or any previous visit (from the past 24 hour(s)).      Therapy progress:  PT  Position Activity Restriction  Other position/activity restrictions: up as tolerated  Objective     Sit to Stand: Contact guard assistance, Minimal Assistance (Both VC/TC for hand/foot placement)  Stand to Sit: Contact guard assistance (VC for hand placement)  Bed to Chair: Contact guard assistance, Minimal assistance (w.RW)  Device: Rolling Walker  Assistance: Minimal assistance, Contact guard assistance  Distance: 100' 20'( includes amb up and down a 10'ramp) 60')  OT  PT Equipment Recommendations  Equipment Needed: Yes  Mobility Devices: Walker  Walker: Rolling  Other: ongoing assessment at this time  Toilet - Technique: Ambulating  Equipment Used: Raised toilet

## 2024-01-02 NOTE — PROGRESS NOTES
assist  Skilled Clinical Factors: SPV-SBA for safety in seated.  Lower Extremity Bathing  Assistance Level: Stand by assist;Contact guard assist  Skilled Clinical Factors: SBA-CGA when in stance for hodan-area washing/drying.  Upper Extremity Dressing  Assistance Level: Supervision  Skilled Clinical Factors: SPV to doff/don shirt in seated.  Lower Extremity Dressing  Assistance Level: Contact guard assist;Stand by assist  Skilled Clinical Factors: SBA-CGA when in stance to manage pants up/down.  Putting On/Taking Off Footwear  Assistance Level: Supervision  Skilled Clinical Factors: SPV to doff/don footwear.  Toileting  Assistance Level: Stand by assist  Skilled Clinical Factors: SBA for 3/3 toileting tasks. Pt continent of urine w/ increased time.  Toilet Transfers  Equipment: Raised toilet seat with arms  Assistance Level: Stand by assist;Contact guard assist  Skilled Clinical Factors: Pt ambulated to toilet w/ RW w/ SBA-CGA, cues for LUE placement on RW, pt attempting to hold onto center bar.  Tub/Shower Transfers  Type: Shower  Transfer From: Rolling walker  Transfer To: Tub transfer bench  Assistance Level: Contact guard assist;Stand by assist  Skilled Clinical Factors: Pt ambulated to shower bench w/ RW w/ SBA-CGA. Cues for technique.          Functional Mobility  Device: Rolling walker  Activity: To/From bathroom  Assistance Level: Stand by assist;Contact guard assist  Skilled Clinical Factors: SBA-CGA w/ use of RW, completed x2. Cues for hand placement on RW.  Supine to Sit  Assistance Level: Supervision  Scooting  Assistance Level: Supervision  Transfers  Surface: From bed;To chair with arms;From chair with arms;Raised toilet Seat  Device: Walker  Sit to Stand  Assistance Level: Stand by assist;Contact guard assist  Stand to Sit  Assistance Level: Stand by assist;Contact guard assist         Assessment  Assessment  Assessment: Pt tolerated session well, completed ambulatory ADL w/ use of RW. Pt demo's  significant difficulty using LUE in fxl tasks w/ poor awareness of its location. Pt also demo's difficulty using RW, placing LUE on the middle bar requiring hand over hand assist to place LUE in appropriate position. Pt w/ delayed proessing/motor planning, requiring cues to complete ADL tasks accurately, although requiring SPV-CGA for standing balance. Pt continues to benefit from skilled OT services in order to maximize fxl independence. Cont OT POC.  Activity Tolerance: Patient tolerated treatment well  Discharge Recommendations: 24 hour supervision or assist;Home with Home health OT  Safety Devices  Safety Devices in place: Yes  Type of devices: Chair alarm in place;Call light within reach;Left in chair    Patient Education  Education  Education Given To: Patient  Education Provided: Role of Therapy;Transfer Training;Plan of Care;Energy Conservation;Precautions;Safety;ADL Function;Fall Prevention Strategies;Mobility Training  Education Method: Demonstration;Verbal  Barriers to Learning: None  Education Outcome: Verbalized understanding;Demonstrated understanding    Plan  Occupational Therapy Plan  Times Per Week: 5x per week/60 minutes per day  Current Treatment Recommendations: Balance training;Functional mobility training;Endurance training;Safety education & training;Self-Care / ADL;Coordination training;Cognitive reorientation;Home management training;Gait training;Positioning;Patient/Caregiver education & training    Goals  Patient Goals   Patient goals : \"to get better and do for myself again\"  Short Term Goals  Time Frame for Short Term Goals: 14 days  Short Term Goal 1: Pt will perform UB and LB dressing routine with AE as needed with modified independence  Short Term Goal 2: Pt will perform bathing routine with supervision  Short Term Goal 3: Pt will scan/locate items in L visual field with no cues required using compensatory techniques as needed  Short Term Goal 4: Pt will perform toileting with mod

## 2024-01-03 LAB
ANION GAP SERPL CALCULATED.3IONS-SCNC: 4 MMOL/L (ref 3–16)
BASOPHILS # BLD: 0 K/UL (ref 0–0.2)
BASOPHILS NFR BLD: 0.9 %
BUN SERPL-MCNC: 15 MG/DL (ref 7–20)
CALCIUM SERPL-MCNC: 9.5 MG/DL (ref 8.3–10.6)
CHLORIDE SERPL-SCNC: 101 MMOL/L (ref 99–110)
CO2 SERPL-SCNC: 31 MMOL/L (ref 21–32)
CREAT SERPL-MCNC: 0.6 MG/DL (ref 0.6–1.2)
DEPRECATED RDW RBC AUTO: 13.7 % (ref 12.4–15.4)
EOSINOPHIL # BLD: 0.1 K/UL (ref 0–0.6)
EOSINOPHIL NFR BLD: 2.4 %
GFR SERPLBLD CREATININE-BSD FMLA CKD-EPI: >60 ML/MIN/{1.73_M2}
GLUCOSE SERPL-MCNC: 94 MG/DL (ref 70–99)
HCT VFR BLD AUTO: 34.7 % (ref 36–48)
HGB BLD-MCNC: 11.4 G/DL (ref 12–16)
LYMPHOCYTES # BLD: 1.1 K/UL (ref 1–5.1)
LYMPHOCYTES NFR BLD: 26.9 %
MCH RBC QN AUTO: 28.2 PG (ref 26–34)
MCHC RBC AUTO-ENTMCNC: 32.9 G/DL (ref 31–36)
MCV RBC AUTO: 85.7 FL (ref 80–100)
MONOCYTES # BLD: 0.4 K/UL (ref 0–1.3)
MONOCYTES NFR BLD: 11.2 %
NEUTROPHILS # BLD: 2.3 K/UL (ref 1.7–7.7)
NEUTROPHILS NFR BLD: 58.6 %
PLATELET # BLD AUTO: 162 K/UL (ref 135–450)
PMV BLD AUTO: 8.6 FL (ref 5–10.5)
POTASSIUM SERPL-SCNC: 4.1 MMOL/L (ref 3.5–5.1)
RBC # BLD AUTO: 4.05 M/UL (ref 4–5.2)
SODIUM SERPL-SCNC: 136 MMOL/L (ref 136–145)
WBC # BLD AUTO: 4 K/UL (ref 4–11)

## 2024-01-03 PROCEDURE — 85025 COMPLETE CBC W/AUTO DIFF WBC: CPT

## 2024-01-03 PROCEDURE — 97129 THER IVNTJ 1ST 15 MIN: CPT

## 2024-01-03 PROCEDURE — 1280000000 HC REHAB R&B

## 2024-01-03 PROCEDURE — 97530 THERAPEUTIC ACTIVITIES: CPT | Performed by: PHYSICAL THERAPIST

## 2024-01-03 PROCEDURE — 97530 THERAPEUTIC ACTIVITIES: CPT

## 2024-01-03 PROCEDURE — 97130 THER IVNTJ EA ADDL 15 MIN: CPT

## 2024-01-03 PROCEDURE — 97116 GAIT TRAINING THERAPY: CPT | Performed by: PHYSICAL THERAPIST

## 2024-01-03 PROCEDURE — 36415 COLL VENOUS BLD VENIPUNCTURE: CPT

## 2024-01-03 PROCEDURE — 97535 SELF CARE MNGMENT TRAINING: CPT

## 2024-01-03 PROCEDURE — 6360000002 HC RX W HCPCS: Performed by: PHYSICAL MEDICINE & REHABILITATION

## 2024-01-03 PROCEDURE — 80048 BASIC METABOLIC PNL TOTAL CA: CPT

## 2024-01-03 PROCEDURE — 6370000000 HC RX 637 (ALT 250 FOR IP): Performed by: PHYSICAL MEDICINE & REHABILITATION

## 2024-01-03 RX ADMIN — ATORVASTATIN CALCIUM 80 MG: 80 TABLET, FILM COATED ORAL at 20:28

## 2024-01-03 RX ADMIN — CARVEDILOL 25 MG: 12.5 TABLET, FILM COATED ORAL at 08:15

## 2024-01-03 RX ADMIN — ENOXAPARIN SODIUM 40 MG: 100 INJECTION SUBCUTANEOUS at 08:15

## 2024-01-03 RX ADMIN — PANTOPRAZOLE SODIUM 40 MG: 40 TABLET, DELAYED RELEASE ORAL at 05:45

## 2024-01-03 RX ADMIN — CARVEDILOL 25 MG: 12.5 TABLET, FILM COATED ORAL at 17:30

## 2024-01-03 RX ADMIN — ESCITALOPRAM 5 MG: 5 TABLET, FILM COATED ORAL at 08:15

## 2024-01-03 RX ADMIN — LOSARTAN POTASSIUM 25 MG: 25 TABLET, FILM COATED ORAL at 08:15

## 2024-01-03 RX ADMIN — ASPIRIN 81 MG: 81 TABLET, CHEWABLE ORAL at 08:15

## 2024-01-03 ASSESSMENT — PAIN SCALES - WONG BAKER
WONGBAKER_NUMERICALRESPONSE: 0

## 2024-01-03 ASSESSMENT — PAIN SCALES - GENERAL
PAINLEVEL_OUTOF10: 0
PAINLEVEL_OUTOF10: 0

## 2024-01-03 NOTE — PROGRESS NOTES
Preparedness Assessment for the Transition Home after Stroke (PATH-s) Instrument given to family caregivers at bedside. Instrument explained to patient and family caregivers. Questions answered regarding the use of the instrument in helping to prepare the family caregivers for the stroke survivors eventual transition home at discharge. This tool, once returned, will be shared with the interdisciplinary team during daily shift change and team huddles and weekly care-conferences as a way to improve communication of caregiver concerns, barriers to discharge and progress in discharge planning among the team. Family caregiver agreed to complete the form and return to nursing. Any questions about the form are to be directed to the ARU Manager, Elzbieta Rodrigues, PASCALE, RN, CRRN or to the charge nurse of the day. Nurses will share information at bedside shift report. Form will be reviewed again beginning 3 days before discharge to make sure all areas of concern are resolved satisfactorily with the family caregiver piror to discharge to home.     Patient family filled out and returned the PATHs questionnaire on 1/2/23. Questionnaire places in patients chart.

## 2024-01-03 NOTE — PROGRESS NOTES
Contact guard assist  Skilled Clinical Factors: VC for aligning w/ intended surface and for hand placement         Environmental Mobility  Ambulation  Surface: Level surface  Device:  (No AD)  Distance: ~350' with doing each of the following:activitiies:   Walking w/o AD and doing stops, starts and quick reversals.  2. Change COG ant via using a SPC w/ UES in  ER /extended position behind back    3. Change COG post via via carrying a large therapeutic  ball w/ BUES behind back   Pt req CGA/ CBA with all activities stated     Activity: Within Unit  Assistance Level: Contact guard assist  Gait Deviations: Decreased step length bilateral;Path deviations;Decreased step length left;Slow goyo  Skilled Clinical Factors: slow goyo, slightly forward flexed pattern,  Stairs  Stair Height: 6''  Device: One handrail (R HR with up and down steps)  Number of Stairs: 12 (3x4)  Additional Factors: Verbal cues;Non-reciprocal going up;Non-reciprocal going down  Assistance Level: Stand by assist  Skilled Clinical Factors: Pt leads with LLE for both amb up and down steps    PT instructed pt w/ the following dynamic standing balance activities w/o support  1. Bouncing a ball and catching it  2. Dribbling a ball with R hand  3. Dribbling a ball with L hand  4. Dribbling ball from R<>L   Pt req CGA  Pt  raajn 20 reps of each with LUE being the most challenging activity for pt 2/2 to decreased sensation  and coordination.                    ASSESSMENT/PROGRESS TOWARDS GOALS       Assessment  Assessment: Pt with impaired safety awareness, impaired L sided awareness/visual neglect, and impaired standing balance from baseline. She also demonstrates delayed processing. L side unawareness is obvious w/ mobility. Pt requiring CGA  with transfers and ambulation, Pt is well below baseline and would benefit from cont therapy to maximize potential and increase functional mobility towards Ind to allow for a safer d/c to home.( IDT rounded on this  date w/ pt)  Activity Tolerance: Patient limited by fatigue;Patient limited by endurance;Treatment limited secondary to decreased cognition  Discharge Recommendations: 24 hour supervision or assist;Home with Home health PT  PT Equipment Recommendations  Other: ongoing assessment at this time    Goals  Patient Goals   Patient Goals : Be able to take care of \"myself\"  Short Term Goals  Time Frame for Short Term Goals: 2 weeks ( all goals are ongoing)  Short Term Goal 1: Ind bed mobility  Short Term Goal 2: Ind transf excluding floor trans  Short Term Goal 3: Amb with LRAD distances >250'at a time with MI  Short Term Goal 4: Amb upand down 5 steps with use of a railing    PLAN OF CARE/SAFETY  Physical Therapy Plan  Days Per Week: 5 Days  Hours Per Day: 1 hour  Therapy Duration: 2 Weeks  Current Treatment Recommendations: Strengthening;Balance training;Functional mobility training;Transfer training;Gait training;Safety education & training;Therapeutic activities;Endurance training;Stair training;Patient/Caregiver education & training;Home exercise program  Safety Devices  Type of Devices: Left in chair;Chair alarm in place;Call light within reach;All fall risk precautions in place;Gait belt;Patient at risk for falls;Nurse notified    EDUCATION  Education  Education Given To: Patient  Education Provided: Precautions;Mobility Training;Transfer Training;Energy Conservation;Cognition;Fall Prevention Strategies;Safety  Education Method: Verbal;Demonstration  Barriers to Learning: Cognition;Vision;Hearing  Education Outcome: Verbalized understanding;Continued education needed        Therapy Time   Individual Concurrent Group Co-treatment   Time In 1315         Time Out 1415         Minutes 60                   Rosy Martinez, PT, 01/03/24 at 4:53 PM

## 2024-01-03 NOTE — PROGRESS NOTES
ACUTE REHAB UNIT  SPEECH/LANGUAGE PATHOLOGY      [x] Daily  [] Weekly Care Conference Note  [] Discharge    Patient:Halina Rae      :1945  MRN:[id number]  Rehab Dx/Hx: CVA (cerebral vascular accident) ([facility]) [I63.9]  Acute cerebrovascular accident (CVA) ([facility]) [I63.9]    Precautions: [] Aspiration  [x] Fall risk  [] Sternal  [] Seizure [] Hip  [] Weight Bearing [] Other     ST Dx: [] Aphasia  [] Dysarthria  [] Apraxia   [] Oropharyngeal dysphagia [x] Cognitive Impairment  [] Other:   Date of Admit: 2023  Room #: 3105/3105-01  Date: 1/3/2024          Current Diet Order:ADULT DIET; Regular  ADULT ORAL NUTRITION SUPPLEMENT; Dinner; Frozen Oral Supplement   Lives With: Daughter  Education: 11th grade education  Occupation: Part time employment  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 Needs consistent cues to attend past midline and shows very poor awareness to objects on L side)  Hearing  Hearing: Exceptions to WFL  Hearing Exceptions: Hard of hearing/hearing concerns (needs things repeated at times)  Barriers toward progress: Cognitive deficit        Date: 1/3/2024       Tx session 1 Tx session 2 Tx session 3   Total Timed Code Min 30 30 10   Total Treatment Minutes 30 30 10   Individual Treatment Minutes 30 30 10   Group Treatment Minutes 0 0 0   Co-Treat Minutes 0 0 0   Brief Exception: N/A N/A N/A   Pain None indicated None indicated None indicated   Pain Intervention: [] RN notified  [] Repositioned  [] Intervention offered and patient declined  [x] N/A  [] Other: [] RN notified  [] Repositioned  [] Intervention offered and patient declined  [] N/A  [] Other: [] RN notified  [] Repositioned  [] Intervention offered and patient declined  [x] N/A  [] Other:   Subjective:     Pt upright in chair upon entry and agreeable to tx  inability to recall and utilize swallow strategies placing pt at risk for aspiration  [] inability to recall and/or comprehend diet recommendations regarding least restrictive diet  [] limited assistance at home upon discharge  [] Other      Electronically signed by  Magy Parsons MA, CCC-SLP  [id number]  Speech-Language Pathologist

## 2024-01-03 NOTE — PROGRESS NOTES
Shift assessment complete. VSS. A&Ox4, confused at times. Denies pain at this time. Fall precautions in place. Patient up to chair for breakfast and lunch. Patient and family, team meeting bedside this morning to discuss discharge planning and concerns. Patient resting in chair, no new concerns this shift.     Vitals:    01/03/24 0813   BP: (!) 144/71   Pulse: 61   Resp: 16   Temp: 98 °F (36.7 °C)   SpO2: 98%

## 2024-01-03 NOTE — PROGRESS NOTES
Pt in bed, alert and oriented. VSS. No pain reported. All safety measures are in place. Call light within reach. Will continue to monitor.    Vitals:    01/02/24 2018   BP: (!) 130/59   Pulse: 61   Resp: 16   Temp: 97.8 °F (36.6 °C)   SpO2: 98%

## 2024-01-03 NOTE — PROGRESS NOTES
Occupational Therapy  Facility/Department: [facility] ACUTE REHAB UNIT  Rehabilitation Occupational Therapy Daily Treatment Note    Date: 1/3/24  Patient Name: Halina Rae       Room: 3105/3105-01  MRN: [id number]  Account: [id number]   : 1945  (78 y.o.) Gender: female                    Past Medical History:  has a past medical history of Cancer of breast (HCC), Hypertension, Stroke due to embolism of right middle cerebral artery (HCC), and Systolic CHF (HCC).  Past Surgical History:   has a past surgical history that includes Appendectomy.    Restrictions  Restrictions/Precautions: Up as Tolerated, Fall Risk  Other position/activity restrictions: up as tolerated    Subjective  Subjective: Pt seated in recliner upon OT entry. Denied pain and the need to toilet. Agreeable to session.  Restrictions/Precautions: Up as Tolerated;Fall Risk             Objective     Cognition  Arousal/Alertness: Delayed responses to stimuli  Following Commands: Follows one step commands with increased time;Follows one step commands with repetition  Attention Span: Attends with cues to redirect;Difficulty attending to directions  Memory: Decreased short term memory;Decreased recall of recent events  Safety Judgement: Decreased awareness of need for assistance;Decreased awareness of need for safety  Problem Solving: Assistance required to generate solutions;Decreased awareness of errors;Assistance required to implement solutions;Assistance required to identify errors made  Insights: Decreased awareness of deficits  Initiation: Requires cues for some  Sequencing: Requires cues for some  Cognition Comment: Decreased motor planning, sequencing, initiation of tasks. Poor awareness of L side despite pt using the L side functionally with cues.  Orientation  Orientation Level: Oriented to place;Oriented to situation;Oriented to person;Oriented to time         ADL  Grooming/Oral Hygiene  Assistance Level: Stand by assist;Contact guard  do for myself again\"  Short Term Goals  Time Frame for Short Term Goals: 14 days  Short Term Goal 1: Pt will perform UB and LB dressing routine with AE as needed with modified independence  Short Term Goal 2: Pt will perform bathing routine with supervision  Short Term Goal 3: Pt will scan/locate items in L visual field with no cues required using compensatory techniques as needed  Short Term Goal 4: Pt will perform toileting with mod I  Short Term Goal 5: Pt will perform simple iADL task with mod I                Therapy Time   Individual Concurrent Group Co-treatment   Time In 1000         Time Out 1100         Minutes 60             Timed Code Treatment Minutes:  60 min     Total Treatment Minutes: 60 min     Annabella Ritter, OT

## 2024-01-03 NOTE — PROGRESS NOTES
Department of Physical Medicine & Rehabilitation  Progress Note    Patient Identification:  Halina Rae  [id number]  : 1945  Admit date: 2023    Chief Complaint: Acute cerebrovascular accident (CVA) (HCC)    Subjective:   Doing well this morning. No new complaints overnight. Slept well last night. She is working hard in therapy and was seen ambulating down the burden with therapy. She feels like she is getting better and is oriented today.       ROS: No f/c, n/v, cp    Objective:  Patient Vitals for the past 24 hrs:   BP Temp Temp src Pulse Resp SpO2   24 0813 (!) 144/71 98 °F (36.7 °C) Oral 61 16 98 %   24 2018 (!) 130/59 97.8 °F (36.6 °C) Oral 61 16 98 %   24 1703 123/63 -- -- 60 -- --       Const: Alert. No distress, pleasant.   HEENT: Normocephalic, atraumatic. Normal sclera/conjunctiva. MMM.   CV: Regular rate and rhythm.   Resp: No respiratory distress. Lungs CTAB.   Abd: Soft, nontender, nondistended, NABS+   Ext: No edema.   Neuro: Alert, oriented, appropriately interactive.   Psych: Cooperative, appropriate mood and affect    Laboratory data: Available via EMR.   Last 24 hour lab  Recent Results (from the past 24 hour(s))   CBC auto differential    Collection Time: 24  7:14 AM   Result Value Ref Range    WBC 4.0 4.0 - 11.0 K/uL    RBC 4.05 4.00 - 5.20 M/uL    Hemoglobin 11.4 (L) 12.0 - 16.0 g/dL    Hematocrit 34.7 (L) 36.0 - 48.0 %    MCV 85.7 80.0 - 100.0 fL    MCH 28.2 26.0 - 34.0 pg    MCHC 32.9 31.0 - 36.0 g/dL    RDW 13.7 12.4 - 15.4 %    Platelets 162 135 - 450 K/uL    MPV 8.6 5.0 - 10.5 fL    Neutrophils % 58.6 %    Lymphocytes % 26.9 %    Monocytes % 11.2 %    Eosinophils % 2.4 %    Basophils % 0.9 %    Neutrophils Absolute 2.3 1.7 - 7.7 K/uL    Lymphocytes Absolute 1.1 1.0 - 5.1 K/uL    Monocytes Absolute 0.4 0.0 - 1.3 K/uL    Eosinophils Absolute 0.1 0.0 - 0.6 K/uL    Basophils Absolute 0.0 0.0 - 0.2 K/uL   Basic Metabolic Panel w/ Reflex to MG    Collection  Time: 01/03/24  7:15 AM   Result Value Ref Range    Sodium 136 136 - 145 mmol/L    Potassium reflex Magnesium 4.1 3.5 - 5.1 mmol/L    Chloride 101 99 - 110 mmol/L    CO2 31 21 - 32 mmol/L    Anion Gap 4 3 - 16    Glucose 94 70 - 99 mg/dL    BUN 15 7 - 20 mg/dL    Creatinine 0.6 0.6 - 1.2 mg/dL    Est, Glom Filt Rate >60 >60    Calcium 9.5 8.3 - 10.6 mg/dL         Therapy progress:  PT  Position Activity Restriction  Other position/activity restrictions: up as tolerated  Objective     Sit to Stand: Contact guard assistance, Minimal Assistance (Both VC/TC for hand/foot placement)  Stand to Sit: Contact guard assistance (VC for hand placement)  Bed to Chair: Contact guard assistance, Minimal assistance (w.RW)  Device: Rolling Walker  Assistance: Minimal assistance, Contact guard assistance  Distance: 100' 20'( includes amb up and down a 10'ramp) 60')  OT  PT Equipment Recommendations  Equipment Needed: Yes  Mobility Devices: Walker  Walker: Rolling  Other: ongoing assessment at this time  Toilet - Technique: Ambulating  Equipment Used: Raised toilet seat with rails  Assessment        SLP          Body mass index is 18.79 kg/m².    Assessment and Plan:  1.  Acute thromboembolic right MCA stroke with gait instability              -Gait improving  - ASA  -lipitor  2.  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  Possible underlying paroxysmal A-fib              -coreg              - cozaar  4.  Hypertension  -monitor  5.  Depression  -lexapro 5mg     Rehab Progress: Improving  Anticipated Dispo: home  Services/DME:   ELOS: 1/9            Gerald Weaver D.O. M.P.H  PM&R  1/3/2024  10:36 AM

## 2024-01-03 NOTE — PLAN OF CARE
Problem: Discharge Planning  Goal: Discharge to home or other facility with appropriate resources  1/3/2024 0719 by Meagan Arzola RN  Outcome: Progressing  Flowsheets (Taken 1/3/2024 0719)  Discharge to home or other facility with appropriate resources:   Identify barriers to discharge with patient and caregiver   Arrange for needed discharge resources and transportation as appropriate   Identify discharge learning needs (meds, wound care, etc)     Problem: Safety - Adult  Goal: Free from fall injury  1/3/2024 0719 by Meagan Arzola, RN  Outcome: Progressing     Problem: Skin/Tissue Integrity  Goal: Absence of new skin breakdown  Description: 1.  Monitor for areas of redness and/or skin breakdown  2.  Assess vascular access sites hourly  3.  Every 4-6 hours minimum:  Change oxygen saturation probe site  4.  Every 4-6 hours:  If on nasal continuous positive airway pressure, respiratory therapy assess nares and determine need for appliance change or resting period.  1/3/2024 0719 by Meagan Arzola RN  Outcome: Progressing  Note: Repositions self throughout shift.     Problem: ABCDS Injury Assessment  Goal: Absence of physical injury  1/3/2024 0719 by Meagan Arzola, RN  Outcome: Progressing  Flowsheets (Taken 1/3/2024 0719)  Absence of Physical Injury: Implement safety measures based on patient assessment     Problem: Nutrition Deficit:  Goal: Optimize nutritional status  Outcome: Progressing  Flowsheets (Taken 1/3/2024 0719)  Nutrient intake appropriate for improving, restoring, or maintaining nutritional needs: Monitor oral intake, labs, and treatment plans     Problem: Pain  Goal: Verbalizes/displays adequate comfort level or baseline comfort level  1/3/2024 0719 by Meagan Arzola RN  Outcome: Progressing  Flowsheets (Taken 1/3/2024 0719)  Verbalizes/displays adequate comfort level or baseline comfort level:   Encourage patient to monitor pain and request assistance   Assess pain using appropriate pain

## 2024-01-03 NOTE — PLAN OF CARE
Team Conference In Room Rounding Note     In room rounding was completed today with RN, PT/OT/ST, LSW and ARU Supervisor present. LSW called patient's grandsonBoogie to allow them to be present and active during the conversation. Halina Rae was educated on their discharge date, 1/9/2024, and the recommendation for Home with home healthcare therapies and nursing after discharge. Physical Therapy informed the patient and family on their current assist level and plan of care. Occupation Therapy provided information to the patient and family on their current assist level for ADLs and the plan of care. Speech Language Pathology educated them on cognitive deficits, diet level, and plan of care that they are currently addressing. The RN staff provided education on medication management, current co-morbidities that are being addressed by the physicians and the patient's plan of care for their stay.    The interdisciplinary team identified the following barriers to address prior to discharge:  Decreased attention to her L side  Decreased cognition and memory  Management of medications    Education/recommendations to assist at discharge included:  24 hr supervision 2/2 cognitive deficits  VC during mobility due to L neglect  Assistance for medication management and meal set-up        Rehab Team Members in attendance for in room rounding:  ARU Supervisor/PPS Coordinator:  Obey oSlis PT, DPT     Social Work:  Alfredo Ray     Nursing:  Meagan Arzola, YANNICK     Therapy:  Rosy Martinez, RAJAT Ritter OTR/L  Magy Parsons MA-CCC, SLP

## 2024-01-03 NOTE — PROGRESS NOTES
Daughter provided update bedside. Family notified of anticipated discharge date of 1/9. Provided daughter with phone number to [facility]. Daughter stated that she will call speech therapy tomorrow 1/4 regarding pt and updates.

## 2024-01-03 NOTE — PLAN OF CARE
Problem: Discharge Planning  Goal: Discharge to home or other facility with appropriate resources  1/2/2024 2026 by Fabio Quiros RN  Outcome: Progressing     Problem: Safety - Adult  Goal: Free from fall injury  1/2/2024 2026 by Fabio Quiros, RN  Outcome: Progressing     Problem: Skin/Tissue Integrity  Goal: Absence of new skin breakdown  Description: 1.  Monitor for areas of redness and/or skin breakdown  2.  Assess vascular access sites hourly  3.  Every 4-6 hours minimum:  Change oxygen saturation probe site  4.  Every 4-6 hours:  If on nasal continuous positive airway pressure, respiratory therapy assess nares and determine need for appliance change or resting period.  1/2/2024 2026 by Faboi Quiros, RN  Outcome: Progressing     Problem: ABCDS Injury Assessment  Goal: Absence of physical injury  1/2/2024 2026 by Fabio Quiros, RN  Outcome: Progressing     Problem: Pain  Goal: Verbalizes/displays adequate comfort level or baseline comfort level  1/2/2024 2026 by Fabio Quiros, RN  Outcome: Progressing

## 2024-01-04 PROCEDURE — 97130 THER IVNTJ EA ADDL 15 MIN: CPT

## 2024-01-04 PROCEDURE — 97535 SELF CARE MNGMENT TRAINING: CPT

## 2024-01-04 PROCEDURE — 1280000000 HC REHAB R&B

## 2024-01-04 PROCEDURE — 6370000000 HC RX 637 (ALT 250 FOR IP): Performed by: PHYSICAL MEDICINE & REHABILITATION

## 2024-01-04 PROCEDURE — 97116 GAIT TRAINING THERAPY: CPT | Performed by: PHYSICAL THERAPIST

## 2024-01-04 PROCEDURE — 97530 THERAPEUTIC ACTIVITIES: CPT

## 2024-01-04 PROCEDURE — 97530 THERAPEUTIC ACTIVITIES: CPT | Performed by: PHYSICAL THERAPIST

## 2024-01-04 PROCEDURE — 97129 THER IVNTJ 1ST 15 MIN: CPT

## 2024-01-04 PROCEDURE — 6360000002 HC RX W HCPCS: Performed by: PHYSICAL MEDICINE & REHABILITATION

## 2024-01-04 RX ADMIN — CARVEDILOL 25 MG: 12.5 TABLET, FILM COATED ORAL at 16:20

## 2024-01-04 RX ADMIN — ESCITALOPRAM 5 MG: 5 TABLET, FILM COATED ORAL at 10:23

## 2024-01-04 RX ADMIN — ASPIRIN 81 MG: 81 TABLET, CHEWABLE ORAL at 10:23

## 2024-01-04 RX ADMIN — LOSARTAN POTASSIUM 25 MG: 25 TABLET, FILM COATED ORAL at 10:23

## 2024-01-04 RX ADMIN — CARVEDILOL 25 MG: 12.5 TABLET, FILM COATED ORAL at 10:23

## 2024-01-04 RX ADMIN — PANTOPRAZOLE SODIUM 40 MG: 40 TABLET, DELAYED RELEASE ORAL at 10:25

## 2024-01-04 RX ADMIN — ATORVASTATIN CALCIUM 80 MG: 80 TABLET, FILM COATED ORAL at 21:13

## 2024-01-04 RX ADMIN — BISACODYL 5 MG: 5 TABLET, COATED ORAL at 10:23

## 2024-01-04 RX ADMIN — ENOXAPARIN SODIUM 40 MG: 100 INJECTION SUBCUTANEOUS at 10:23

## 2024-01-04 NOTE — PROGRESS NOTES
Occupational Therapy  Facility/Department: [facility] ACUTE REHAB UNIT  Rehabilitation Occupational Therapy Daily Treatment Note    Date: 24  Patient Name: Halina Rae       Room: 3105/3105-01  MRN: [id number]  Account: [id number]   : 1945  (78 y.o.) Gender: female                    Past Medical History:  has a past medical history of Cancer of breast (HCC), Hypertension, Stroke due to embolism of right middle cerebral artery (HCC), and Systolic CHF (HCC).  Past Surgical History:   has a past surgical history that includes Appendectomy.    Restrictions  Restrictions/Precautions: Up as Tolerated, Fall Risk  Other position/activity restrictions: up as tolerated    Subjective  Subjective: Pt seated in recliner upon OT entry. Denied pain and the need to toilet, although once standing pt stated \"I'm going to head to the toilet first.\" Agreeable to session. Cousin arrived mid session.  Restrictions/Precautions: Up as Tolerated;Fall Risk             Objective     Cognition  Arousal/Alertness: Delayed responses to stimuli  Following Commands: Follows one step commands with increased time;Follows one step commands with repetition  Memory: Decreased short term memory;Decreased recall of recent events  Safety Judgement: Decreased awareness of need for assistance;Decreased awareness of need for safety  Problem Solving: Assistance required to generate solutions;Decreased awareness of errors;Assistance required to implement solutions;Assistance required to identify errors made  Insights: Decreased awareness of deficits  Initiation: Requires cues for some  Sequencing: Requires cues for some  Orientation  Orientation Level: Oriented to place;Oriented to situation;Oriented to person;Oriented to time         ADL  Grooming/Oral Hygiene  Assistance Level: Stand by assist;Contact guard assist  Skilled Clinical Factors: SPV-SBA for oral care in stance at sink. Cues for error recognition/awareness (requesting hot water but only  training    Goals  Patient Goals   Patient goals : \"to get better and do for myself again\"  Short Term Goals  Time Frame for Short Term Goals: 14 days  Short Term Goal 1: Pt will perform UB and LB dressing routine with AE as needed with modified independence  Short Term Goal 2: Pt will perform bathing routine with supervision  Short Term Goal 3: Pt will scan/locate items in L visual field with no cues required using compensatory techniques as needed  Short Term Goal 4: Pt will perform toileting with mod I  Short Term Goal 5: Pt will perform simple iADL task with mod I                   Therapy Time   Individual Concurrent Group Co-treatment   Time In 1315         Time Out 1415         Minutes 60          Timed Code Treatment Minutes:  60 min     Total Treatment Minutes: 60 min        Annabella Ritter, OT

## 2024-01-04 NOTE — CONSULTS
Comprehensive Nutrition Assessment    RECOMMENDATIONS:  PO Diet: Continue regular diet  ONS: Increase Magic Cup to BID  Nutrition Education: Education not indicated     NUTRITION ASSESSMENT:   Nutritional summary & status: Follow-up.  Pt continues w/ fair PO intake >50% for most meals.  Noted pt w/o BM recorded since 12/29- pt reports +bm 1/3.  Pt reports disliking some food options at [facility].  RD provided menu and encouraged high kcal/pro options.  Pt reports liking Magic Cup.  Will increase frequency.   Admission // PMH: CVA // CHF, hypertension, arthritis, depression    MALNUTRITION ASSESSMENT  Context of Malnutrition: Acute Illness   Malnutrition Status: No malnutrition  Findings of the 6 clinical characteristics of malnutrition (Minimum of 2 out of 6 clinical characteristics is required to make the diagnosis of moderate or severe Protein Calorie Malnutrition based on AND/ASPEN Guidelines):  Energy Intake:  No significant decrease in energy intake  Weight Loss:  No significant weight loss     Body Fat Loss:  No significant body fat loss     Muscle Mass Loss:  No significant muscle mass loss      NUTRITION DIAGNOSIS   Increased nutrient needs related to increase demand for energy/nutrients as evidenced by  (extended PT/OT)    Nutrition Monitoring and Evaluation:   Food/Nutrient Intake Outcomes:  Food and Nutrient Intake, Supplement Intake  Physical Signs/Symptoms Outcomes:  Biochemical Data, Nutrition Focused Physical Findings, Weight     OBJECTIVE DATA: Significant to nutrition assessment  Nutrition Related Findings: +bm 1/3; trace BLE edema; labs reviewed  Wounds: None  Nutrition Goals: PO intake 75% or greater, by next RD assessment     CURRENT NUTRITION THERAPIES  ADULT DIET; Regular  ADULT ORAL NUTRITION SUPPLEMENT; Dinner; Frozen Oral Supplement  PO Intake: 51-75%, %   PO Supplement Intake:Unable to assess, 0%  Additional Sources of Calories/IVF:n/a     COMPARATIVE STANDARDS  Energy (kcal):

## 2024-01-04 NOTE — PROGRESS NOTES
ACUTE REHAB UNIT  SPEECH/LANGUAGE PATHOLOGY      [x] Daily  [] Weekly Care Conference Note  [] Discharge    Patient:Halina Rae      :1945  MRN:[id number]  Rehab Dx/Hx: CVA (cerebral vascular accident) ([facility]) [I63.9]  Acute cerebrovascular accident (CVA) ([facility]) [I63.9]    Precautions: [] Aspiration  [x] Fall risk  [] Sternal  [] Seizure [] Hip  [] Weight Bearing [] Other     ST Dx: [] Aphasia  [] Dysarthria  [] Apraxia   [] Oropharyngeal dysphagia [x] Cognitive Impairment  [] Other:   Date of Admit: 2023  Room #: 3105/3105-01  Date: 2024          Current Diet Order:ADULT DIET; Regular  ADULT ORAL NUTRITION SUPPLEMENT; Dinner; Frozen Oral Supplement   Lives With: Daughter  Education: 11th grade education  Occupation: Part time employment  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 Needs consistent cues to attend past midline and shows very poor awareness to objects on L side)  Hearing  Hearing: Exceptions to WFL  Hearing Exceptions: Hard of hearing/hearing concerns (needs things repeated at times)  Barriers toward progress: Cognitive deficit        Date: 2024      Tx session 1 Tx session 2   Total Timed Code Min 30 30   Total Treatment Minutes 30 30   Individual Treatment Minutes 30 30   Group Treatment Minutes 0 0   Co-Treat Minutes 0 0   Brief Exception: N/A N/A   Pain None indicated None indicated   Pain Intervention: [] RN notified  [] Repositioned  [] Intervention offered and patient declined  [x] N/A  [] Other: [] RN notified  [] Repositioned  [] Intervention offered and patient declined  [x] N/A  [] Other:   Subjective:     Pt resting in recliner upon entry, agreeable to be seated upright and participate in session.  Pt asleep in chair upon entry, easily roused, and agreeable to tx session.    Objective / Goals:    No    [] TBD based on progress while on ARU     [] Vital Stim indicated     [] Other:   Estimated discharge date: 1/8/24    Barriers to home discharge:  [x] inability to independently manage medications, will need assist/supervision  [x] inability to independently manage finances, will need assist/supervision  [] inability to effectively communicate in emergent situations (ex: calling 911, stating name, reduced intelligibility, etc)  [] Inability to communicate or demonstrate problem solving due to cognitive-communicative impairment  [x] severity of cognition (mild, mod, severe) with reduced insight negatively impacting safety/independence  [] inability to recall and utilize swallow strategies placing pt at risk for aspiration  [] inability to recall and/or comprehend diet recommendations regarding least restrictive diet  [] limited assistance at home upon discharge  [] Other    Electronically signed by:  Session 1:  Ni Palacio M.A., CCC-SLP  [id number]  Speech-Language Pathologist    Session 2:  Magy Parsons MA, CCC-SLP  [id number]  Speech-Language Pathologist

## 2024-01-04 NOTE — PROGRESS NOTES
Physical Therapy  Facility/Department: [facility] ACUTE REHAB UNIT  Rehabilitation Physical Therapy Treatment Note    NAME: Halina Rae  : 1945 (78 y.o.)  MRN: [id number]  CODE STATUS: Full Code    Date of Service: 24       Restrictions:  Restrictions/Precautions: Up as Tolerated, Fall Risk  Position Activity Restriction  Other position/activity restrictions: up as tolerated     SUBJECTIVE  Subjective  Subjective: Pt sitting up in bedside chair when PT arrived.Pt agreeable to therapy  Pain: No c/o pain throughout session        Post Treatment Pain Screening         OBJECTIVE  Cognition  Overall Cognitive Status: Exceptions  Arousal/Alertness: Delayed responses to stimuli  Following Commands: Follows one step commands with increased time;Follows one step commands with repetition  Attention Span: Attends with cues to redirect;Difficulty attending to directions  Memory: Decreased short term memory;Decreased recall of recent events  Safety Judgement: Decreased awareness of need for assistance;Decreased awareness of need for safety  Problem Solving: Assistance required to generate solutions;Decreased awareness of errors;Assistance required to implement solutions;Assistance required to identify errors made  Insights: Decreased awareness of deficits  Initiation: Requires cues for some  Sequencing: Requires cues for some  Cognition Comment: Decreased motor planning, sequencing, initiation of tasks. Poor awareness of L side despite pt using the L side functionally with VC/ TC  Orientation  Overall Orientation Status: Impaired  Orientation Level: Oriented to place;Oriented to situation;Oriented to person;Oriented to time    Functional Mobility  Balance  Sitting Balance: Independent (AEB pt sitting and donning shoes  with ability to lean forwards to the floor)  Standing Balance: Minimal assistance (CGA AEB w walking but min A / CGA w/ standing balance activities)  Transfers  Surface: From chair with arms;To chair with  demonstrates delayed processing. L side unawareness is obvious w/ mobility. Pt requiring CGA  with transfers and ambulation, Pt is well below baseline and would benefit from cont therapy to maximize potential and increase functional mobility towards Ind to allow for a safer d/c to home.  Activity Tolerance: Patient limited by fatigue;Patient limited by endurance;Treatment limited secondary to decreased cognition  Discharge Recommendations: 24 hour supervision or assist;Home with Home health PT  PT Equipment Recommendations  Other: ongoing assessment at this time    Goals  Patient Goals   Patient Goals : Be able to take care of \"myself\"  Short Term Goals  Time Frame for Short Term Goals: 2 weeks ( all goals are ongoing)  Short Term Goal 1: Ind bed mobility  Short Term Goal 2: Ind transf excluding floor trans  Short Term Goal 3: Amb with LRAD distances >250'at a time with MI  Short Term Goal 4: Amb upand down 5 steps with use of a railing    PLAN OF CARE/SAFETY  Physical Therapy Plan  Days Per Week: 5 Days  Hours Per Day: 1 hour  Therapy Duration: 2 Weeks  Current Treatment Recommendations: Strengthening;Balance training;Functional mobility training;Transfer training;Gait training;Safety education & training;Therapeutic activities;Endurance training;Stair training;Patient/Caregiver education & training;Home exercise program  Safety Devices  Type of Devices: Left in chair;Chair alarm in place;Call light within reach;All fall risk precautions in place;Gait belt;Patient at risk for falls;Nurse notified    EDUCATION  Education  Education Given To: Patient  Education Provided: Precautions;Mobility Training;Transfer Training;Energy Conservation;Cognition;Fall Prevention Strategies;Safety  Education Method: Verbal;Demonstration  Barriers to Learning: Cognition;Vision;Hearing  Education Outcome: Verbalized understanding;Continued education needed        Therapy Time   Individual Concurrent Group Co-treatment   Time In 0900

## 2024-01-04 NOTE — FLOWSHEET NOTE
01/03/24 2027   Vital Signs   Temp 98.2 °F (36.8 °C)   Temp Source Oral   Pulse 60   Heart Rate Source Monitor   Respirations 16   /65   MAP (Calculated) 83     Patient resting up in chair upon entering room. Patient denies pain at this time, vitals as shown. Chair alarm on and call light within reach.

## 2024-01-04 NOTE — PLAN OF CARE
Problem: Discharge Planning  Goal: Discharge to home or other facility with appropriate resources  Outcome: Progressing  Flowsheets (Taken 1/3/2024 0719 by Meagan Arzola, RN)  Discharge to home or other facility with appropriate resources:   Identify barriers to discharge with patient and caregiver   Arrange for needed discharge resources and transportation as appropriate   Identify discharge learning needs (meds, wound care, etc)     Problem: Safety - Adult  Goal: Free from fall injury  1/4/2024 1037 by Princess Fisher, RN  Outcome: Progressing  Flowsheets (Taken 1/4/2024 1037)  Free From Fall Injury: Instruct family/caregiver on patient safety     Problem: Skin/Tissue Integrity  Goal: Absence of new skin breakdown  Description: 1.  Monitor for areas of redness and/or skin breakdown  2.  Assess vascular access sites hourly  3.  Every 4-6 hours minimum:  Change oxygen saturation probe site  4.  Every 4-6 hours:  If on nasal continuous positive airway pressure, respiratory therapy assess nares and determine need for appliance change or resting period.  1/4/2024 1037 by Princess Fisher, RN  Outcome: Progressing     Problem: ABCDS Injury Assessment  Goal: Absence of physical injury  1/4/2024 1037 by Princess Fisher RN  Outcome: Progressing  Flowsheets (Taken 1/3/2024 0719 by Meagan Arzola, RN)  Absence of Physical Injury: Implement safety measures based on patient assessment     Problem: Nutrition Deficit:  Goal: Optimize nutritional status  Outcome: Progressing  Flowsheets (Taken 1/3/2024 0719 by Meagan Arzola, RN)  Nutrient intake appropriate for improving, restoring, or maintaining nutritional needs: Monitor oral intake, labs, and treatment plans     Problem: Pain  Goal: Verbalizes/displays adequate comfort level or baseline comfort level  Outcome: Progressing  Flowsheets  Taken 1/4/2024 1037  Verbalizes/displays adequate comfort level or baseline comfort level:   Encourage patient to monitor pain and

## 2024-01-04 NOTE — PLAN OF CARE
Problem: Safety - Adult  Goal: Free from fall injury  Outcome: Progressing     Problem: Skin/Tissue Integrity  Goal: Absence of new skin breakdown  Description: 1.  Monitor for areas of redness and/or skin breakdown  2.  Assess vascular access sites hourly  3.  Every 4-6 hours minimum:  Change oxygen saturation probe site  4.  Every 4-6 hours:  If on nasal continuous positive airway pressure, respiratory therapy assess nares and determine need for appliance change or resting period.  Outcome: Progressing     Problem: ABCDS Injury Assessment  Goal: Absence of physical injury  Outcome: Progressing

## 2024-01-04 NOTE — PROGRESS NOTES
Department of Physical Medicine & Rehabilitation  Progress Note    Patient Identification:  Halina Rae  [id number]  : 1945  Admit date: 2023    Chief Complaint: Acute cerebrovascular accident (CVA) (HCC)    Subjective:   Seen in therapy this morning. She is not have any new pain this morning and is alert and oriented this morning. She is working hard daily to improve her endurance. She will likely be able to go home with family support. Slept well last night.       ROS: No f/c, n/v, cp    Objective:  Patient Vitals for the past 24 hrs:   BP Temp Temp src Pulse Resp SpO2   24 118/65 98.2 °F (36.8 °C) Oral 60 16 98 %   24 1730 (!) 151/67 -- -- 64 -- --       Const: Alert. No distress, pleasant.   HEENT: Normocephalic, atraumatic. Normal sclera/conjunctiva. MMM.   CV: Regular rate and rhythm.   Resp: No respiratory distress. Lungs CTAB.   Abd: Soft, nontender, nondistended, NABS+   Ext: No edema.   Neuro: Alert, oriented, appropriately interactive.   Psych: Cooperative, appropriate mood and affect    Laboratory data: Available via EMR.   Last 24 hour lab  No results found for this or any previous visit (from the past 24 hour(s)).        Therapy progress:  PT  Position Activity Restriction  Other position/activity restrictions: up as tolerated  Objective     Sit to Stand: Contact guard assistance, Minimal Assistance (Both VC/TC for hand/foot placement)  Stand to Sit: Contact guard assistance (VC for hand placement)  Bed to Chair: Contact guard assistance, Minimal assistance (w.RW)  Device: Rolling Walker  Assistance: Minimal assistance, Contact guard assistance  Distance: 100' 20'( includes amb up and down a 10'ramp) 60')  OT  PT Equipment Recommendations  Equipment Needed: Yes  Mobility Devices: Walker  Walker: Rolling  Other: ongoing assessment at this time  Toilet - Technique: Ambulating  Equipment Used: Raised toilet seat with rails  Assessment        SLP          Body mass index

## 2024-01-05 LAB
ANION GAP SERPL CALCULATED.3IONS-SCNC: 9 MMOL/L (ref 3–16)
BASOPHILS # BLD: 0 K/UL (ref 0–0.2)
BASOPHILS NFR BLD: 0.5 %
BUN SERPL-MCNC: 13 MG/DL (ref 7–20)
CALCIUM SERPL-MCNC: 9.5 MG/DL (ref 8.3–10.6)
CHLORIDE SERPL-SCNC: 101 MMOL/L (ref 99–110)
CO2 SERPL-SCNC: 27 MMOL/L (ref 21–32)
CREAT SERPL-MCNC: <0.5 MG/DL (ref 0.6–1.2)
DEPRECATED RDW RBC AUTO: 13.8 % (ref 12.4–15.4)
EOSINOPHIL # BLD: 0.1 K/UL (ref 0–0.6)
EOSINOPHIL NFR BLD: 0.9 %
GFR SERPLBLD CREATININE-BSD FMLA CKD-EPI: >60 ML/MIN/{1.73_M2}
GLUCOSE SERPL-MCNC: 89 MG/DL (ref 70–99)
HCT VFR BLD AUTO: 37.3 % (ref 36–48)
HGB BLD-MCNC: 12.1 G/DL (ref 12–16)
LYMPHOCYTES # BLD: 0.8 K/UL (ref 1–5.1)
LYMPHOCYTES NFR BLD: 10.4 %
MCH RBC QN AUTO: 27.9 PG (ref 26–34)
MCHC RBC AUTO-ENTMCNC: 32.5 G/DL (ref 31–36)
MCV RBC AUTO: 85.8 FL (ref 80–100)
MONOCYTES # BLD: 0.6 K/UL (ref 0–1.3)
MONOCYTES NFR BLD: 7.5 %
NEUTROPHILS # BLD: 6.1 K/UL (ref 1.7–7.7)
NEUTROPHILS NFR BLD: 80.7 %
PLATELET # BLD AUTO: 192 K/UL (ref 135–450)
PMV BLD AUTO: 8.9 FL (ref 5–10.5)
POTASSIUM SERPL-SCNC: 4 MMOL/L (ref 3.5–5.1)
RBC # BLD AUTO: 4.35 M/UL (ref 4–5.2)
SODIUM SERPL-SCNC: 137 MMOL/L (ref 136–145)
WBC # BLD AUTO: 7.6 K/UL (ref 4–11)

## 2024-01-05 PROCEDURE — 97535 SELF CARE MNGMENT TRAINING: CPT

## 2024-01-05 PROCEDURE — 36415 COLL VENOUS BLD VENIPUNCTURE: CPT

## 2024-01-05 PROCEDURE — 97116 GAIT TRAINING THERAPY: CPT | Performed by: PHYSICAL THERAPIST

## 2024-01-05 PROCEDURE — 1280000000 HC REHAB R&B

## 2024-01-05 PROCEDURE — 6360000002 HC RX W HCPCS: Performed by: PHYSICAL MEDICINE & REHABILITATION

## 2024-01-05 PROCEDURE — 6370000000 HC RX 637 (ALT 250 FOR IP): Performed by: PHYSICAL MEDICINE & REHABILITATION

## 2024-01-05 PROCEDURE — 97129 THER IVNTJ 1ST 15 MIN: CPT

## 2024-01-05 PROCEDURE — 97130 THER IVNTJ EA ADDL 15 MIN: CPT

## 2024-01-05 PROCEDURE — 80048 BASIC METABOLIC PNL TOTAL CA: CPT

## 2024-01-05 PROCEDURE — 85025 COMPLETE CBC W/AUTO DIFF WBC: CPT

## 2024-01-05 PROCEDURE — 97530 THERAPEUTIC ACTIVITIES: CPT

## 2024-01-05 PROCEDURE — 97110 THERAPEUTIC EXERCISES: CPT | Performed by: PHYSICAL THERAPIST

## 2024-01-05 RX ORDER — SENNA AND DOCUSATE SODIUM 50; 8.6 MG/1; MG/1
2 TABLET, FILM COATED ORAL 2 TIMES DAILY
Status: DISCONTINUED | OUTPATIENT
Start: 2024-01-05 | End: 2024-01-05

## 2024-01-05 RX ADMIN — ESCITALOPRAM 5 MG: 5 TABLET, FILM COATED ORAL at 08:27

## 2024-01-05 RX ADMIN — PANTOPRAZOLE SODIUM 40 MG: 40 TABLET, DELAYED RELEASE ORAL at 06:16

## 2024-01-05 RX ADMIN — LOSARTAN POTASSIUM 25 MG: 25 TABLET, FILM COATED ORAL at 08:27

## 2024-01-05 RX ADMIN — ATORVASTATIN CALCIUM 80 MG: 80 TABLET, FILM COATED ORAL at 20:13

## 2024-01-05 RX ADMIN — ENOXAPARIN SODIUM 40 MG: 100 INJECTION SUBCUTANEOUS at 08:26

## 2024-01-05 RX ADMIN — CARVEDILOL 25 MG: 12.5 TABLET, FILM COATED ORAL at 08:27

## 2024-01-05 RX ADMIN — ASPIRIN 81 MG: 81 TABLET, CHEWABLE ORAL at 08:27

## 2024-01-05 RX ADMIN — CARVEDILOL 25 MG: 12.5 TABLET, FILM COATED ORAL at 17:18

## 2024-01-05 ASSESSMENT — PAIN SCALES - GENERAL: PAINLEVEL_OUTOF10: 0

## 2024-01-05 NOTE — PROGRESS NOTES
Department of Physical Medicine & Rehabilitation  Progress Note    Patient Identification:  Halina Rae  [id number]  : 1945  Admit date: 2023    Chief Complaint: Acute cerebrovascular accident (CVA) (HCC)    Subjective:   Doing well this morning. She has no new pain and was able to sleep well last night. She was seen today working with SLP. She continues to be AAOx3 but having some mild cog issues with complex tasks. Continues to work hard with therapy.        ROS: No f/c, n/v, cp    Objective:  Patient Vitals for the past 24 hrs:   BP Temp Temp src Pulse Resp SpO2   24 0826 137/74 97.8 °F (36.6 °C) Oral 66 16 98 %   24 2103 (!) 166/62 98.1 °F (36.7 °C) Oral 60 16 98 %   24 1620 (!) 156/74 -- -- 57 -- --   24 1015 (!) 154/71 97.2 °F (36.2 °C) Oral 58 16 97 %       Const: Alert. No distress, pleasant.   HEENT: Normocephalic, atraumatic. Normal sclera/conjunctiva. MMM.   CV: Regular rate and rhythm.   Resp: No respiratory distress. Lungs CTAB.   Abd: Soft, nontender, nondistended, NABS+   Ext: No edema.   Neuro: Alert, oriented, appropriately interactive.   Psych: Cooperative, appropriate mood and affect    Laboratory data: Available via EMR.   Last 24 hour lab  Recent Results (from the past 24 hour(s))   Basic Metabolic Panel w/ Reflex to MG    Collection Time: 24  6:05 AM   Result Value Ref Range    Sodium 137 136 - 145 mmol/L    Potassium reflex Magnesium 4.0 3.5 - 5.1 mmol/L    Chloride 101 99 - 110 mmol/L    CO2 27 21 - 32 mmol/L    Anion Gap 9 3 - 16    Glucose 89 70 - 99 mg/dL    BUN 13 7 - 20 mg/dL    Creatinine <0.5 (L) 0.6 - 1.2 mg/dL    Est, Glom Filt Rate >60 >60    Calcium 9.5 8.3 - 10.6 mg/dL   CBC auto differential    Collection Time: 24  6:05 AM   Result Value Ref Range    WBC 7.6 4.0 - 11.0 K/uL    RBC 4.35 4.00 - 5.20 M/uL    Hemoglobin 12.1 12.0 - 16.0 g/dL    Hematocrit 37.3 36.0 - 48.0 %    MCV 85.8 80.0 - 100.0 fL    MCH 27.9 26.0 - 34.0 pg     MCHC 32.5 31.0 - 36.0 g/dL    RDW 13.8 12.4 - 15.4 %    Platelets 192 135 - 450 K/uL    MPV 8.9 5.0 - 10.5 fL    Neutrophils % 80.7 %    Lymphocytes % 10.4 %    Monocytes % 7.5 %    Eosinophils % 0.9 %    Basophils % 0.5 %    Neutrophils Absolute 6.1 1.7 - 7.7 K/uL    Lymphocytes Absolute 0.8 (L) 1.0 - 5.1 K/uL    Monocytes Absolute 0.6 0.0 - 1.3 K/uL    Eosinophils Absolute 0.1 0.0 - 0.6 K/uL    Basophils Absolute 0.0 0.0 - 0.2 K/uL           Therapy progress:  PT  Position Activity Restriction  Other position/activity restrictions: up as tolerated  Objective     Sit to Stand: Contact guard assistance, Minimal Assistance (Both VC/TC for hand/foot placement)  Stand to Sit: Contact guard assistance (VC for hand placement)  Bed to Chair: Contact guard assistance, Minimal assistance (w.RW)  Device: Rolling Walker  Assistance: Minimal assistance, Contact guard assistance  Distance: 100' 20'( includes amb up and down a 10'ramp) 60')  OT  PT Equipment Recommendations  Equipment Needed: Yes  Mobility Devices: Walker  Walker: Rolling  Other: ongoing assessment at this time  Toilet - Technique: Ambulating  Equipment Used: Raised toilet seat with rails  Assessment        SLP          Body mass index is 18.79 kg/m².    Assessment and Plan:  1.  Acute thromboembolic right MCA stroke with gait instability              -Gait improving  - ASA  -lipitor  2.  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  Possible underlying paroxysmal A-fib              -coreg              - cozaar  4.  Hypertension  -monitor  5.  Depression  -lexapro 5mg     Rehab Progress: Improving  Anticipated Dispo: home  Services/DME: HH- family support   ELOS: 1/9            Gerald Weaver D.O. M.P.H  PM&R  1/5/2024  10:11 AM

## 2024-01-05 NOTE — PROGRESS NOTES
ACUTE REHAB UNIT  SPEECH/LANGUAGE PATHOLOGY      [x] Daily  [] Weekly Care Conference Note  [] Discharge    Patient:Halina Rae      :1945  MRN:[id number]  Rehab Dx/Hx: CVA (cerebral vascular accident) ([facility]) [I63.9]  Acute cerebrovascular accident (CVA) ([facility]) [I63.9]    Precautions: [] Aspiration  [x] Fall risk  [] Sternal  [] Seizure [] Hip  [] Weight Bearing [] Other     ST Dx: [] Aphasia  [] Dysarthria  [] Apraxia   [] Oropharyngeal dysphagia [x] Cognitive Impairment  [] Other:   Date of Admit: 2023  Room #: 3105/3105-01  Date: 2024          Current Diet Order:ADULT DIET; Regular  ADULT ORAL NUTRITION SUPPLEMENT; Dinner; Frozen Oral Supplement   Lives With: Daughter  Education: 11th grade education  Occupation: Part time employment  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 Needs consistent cues to attend past midline and shows very poor awareness to objects on L side)  Hearing  Hearing: Exceptions to WFL  Hearing Exceptions: Hard of hearing/hearing concerns (needs things repeated at times)  Barriers toward progress: Cognitive deficit        Date: 2024       Tx session 1 Tx session 2 Tx session 3   Total Timed Code Min 30 30    Total Treatment Minutes 30 30    Individual Treatment Minutes 30 30    Group Treatment Minutes 0 0 0   Co-Treat Minutes 0 0 0   Brief Exception: 0     Pain None reported None reported     Pain Intervention: [] RN notified  [] Repositioned  [] Intervention offered and patient declined  [x] N/A  [] Other: [] RN notified  [] Repositioned  [] Intervention offered and patient declined  [x] N/A  [] Other: [] RN notified  [] Repositioned  [] Intervention offered and patient declined  [] N/A  [] Other:   Subjective:     Pt alert and sitting upright in chair upon arrival. Pt sitting up in chair. Pt

## 2024-01-05 NOTE — PROGRESS NOTES
Shift assessment complete. VSS. A&Ox3. Denies pain at this time. Fall precautions in place. Patient up to chair for breakfast and lunch, chair alarm utilized, call light within reach. Patient with no new complaints at this time. BM this morning.     Vitals:    01/05/24 0826   BP: 137/74   Pulse: 66   Resp: 16   Temp: 97.8 °F (36.6 °C)   SpO2: 98%

## 2024-01-05 NOTE — PROGRESS NOTES
Occupational Therapy  Facility/Department: [facility] ACUTE REHAB UNIT  Rehabilitation Occupational Therapy Daily Treatment Note    Date: 24  Patient Name: Halina Rae       Room: 3105/3105-01  MRN: [id number]  Account: [id number]   : 1945  (78 y.o.) Gender: female                    Past Medical History:  has a past medical history of Cancer of breast (HCC), Hypertension, Stroke due to embolism of right middle cerebral artery (HCC), and Systolic CHF (HCC).  Past Surgical History:   has a past surgical history that includes Appendectomy.    Restrictions  Restrictions/Precautions: Up as Tolerated, Fall Risk  Other position/activity restrictions: up as tolerated    Subjective  Subjective: Pt seated in recliner upon OT entry. Agreeable to ADL. Denied pain.  Restrictions/Precautions: Up as Tolerated;Fall Risk             Objective     Cognition  Arousal/Alertness: Delayed responses to stimuli  Following Commands: Follows one step commands with increased time;Follows one step commands with repetition  Attention Span: Attends with cues to redirect;Difficulty attending to directions  Memory: Decreased short term memory;Decreased recall of recent events  Safety Judgement: Decreased awareness of need for assistance;Decreased awareness of need for safety  Problem Solving: Assistance required to generate solutions;Decreased awareness of errors;Assistance required to implement solutions;Assistance required to identify errors made  Insights: Decreased awareness of deficits  Initiation: Requires cues for some  Sequencing: Requires cues for some  Cognition Comment: Decreased motor planning, sequencing, initiation of tasks. decreased awareness of L side despite pt using the L side functionally.  Orientation  Orientation Level: Oriented to place;Oriented to situation;Oriented to person;Oriented to time         ADL  Upper Extremity Bathing  Assistance Level: Supervision;Stand by assist  Skilled Clinical Factors: SPV-SBA for  safety in seated.  Lower Extremity Bathing  Assistance Level: Stand by assist;Supervision  Skilled Clinical Factors: SPV-SBA when in stance for hodan-area washing/drying.  Upper Extremity Dressing  Assistance Level: Supervision  Skilled Clinical Factors: SPV to doff/don shirt in seated.  Lower Extremity Dressing  Assistance Level: Verbal cues;Stand by assist  Skilled Clinical Factors: Pt able to pull brief/pants down and unthread BLE seated on commode w/ increased time. Threaded BLE thru brief/pants, tactile cueing required for orientation to brief. Pt stood to pull up w/o AD w/ SBA.  Putting On/Taking Off Footwear  Assistance Level: Supervision  Skilled Clinical Factors: SPV to doff/don footwear.  Toileting  Assistance Level: Stand by assist  Skilled Clinical Factors: SBA for 3/3 toileting tasks. Pt continent of urine w/ increased time.  Toilet Transfers  Equipment: Raised toilet seat with arms  Assistance Level: Stand by assist;Contact guard assist  Skilled Clinical Factors: Attempted ambulation to toilet w/o AD but pt demo'ing difficulty advancing RLE, reported \"knee stiffness.\" Able to complete ambulation w/ use of RW although required cues for hand positioning/awareness of LUE on RW. Required CGA for steadying.  Tub/Shower Transfers  Type: Shower  Transfer From: Rolling walker  Transfer To: Tub transfer bench  Assistance Level: Contact guard assist;Stand by assist  Skilled Clinical Factors: Pt ambulated to shower bench w/ RW w/ SBA-CGA. Cues for technique.          Functional Mobility  Device: Rolling walker  Activity: To/From bathroom  Assistance Level: Contact guard assist  Scooting  Assistance Level: Supervision  Transfers  Surface: To chair with arms;From chair with arms;Raised toilet Seat  Sit to Stand  Assistance Level: Stand by assist  Stand to Sit  Assistance Level: Stand by assist         Assessment  Assessment  Assessment: Pt tolerated session well, although initially lethargic. Attempted ambulation to

## 2024-01-05 NOTE — PROGRESS NOTES
Physical Therapy  Facility/Department: [facility] ACUTE REHAB UNIT  Rehabilitation Physical Therapy Treatment Note    NAME: Halina Rae  : 1945 (78 y.o.)  MRN: [id number]  CODE STATUS: Full Code    Date of Service: 24       Restrictions:  Restrictions/Precautions: Up as Tolerated, Fall Risk  Position Activity Restriction  Other position/activity restrictions: up as tolerated     SUBJECTIVE  Subjective  Subjective: Pt sitting up in bedside chair when PT arrived.Pt agreeable to therapy  Pain: No c/o pain throughout session        Post Treatment Pain Screening         OBJECTIVE  Cognition  Arousal/Alertness: Delayed responses to stimuli  Following Commands: Follows one step commands with increased time;Follows one step commands with repetition  Attention Span: Attends with cues to redirect;Difficulty attending to directions  Memory: Decreased short term memory;Decreased recall of recent events  Safety Judgement: Decreased awareness of need for assistance;Decreased awareness of need for safety  Problem Solving: Assistance required to generate solutions;Decreased awareness of errors;Assistance required to implement solutions;Assistance required to identify errors made  Insights: Decreased awareness of deficits  Initiation: Requires cues for some  Sequencing: Requires cues for some  Cognition Comment: Decreased motor planning, sequencing, initiation of tasks. decreased awareness of L side despite pt using the L side functionally.  Orientation  Orientation Level: Oriented to place;Oriented to situation;Oriented to person;Oriented to time    Functional Mobility  Balance  Sitting Balance: Independent (AEB pt sitting and donning shoes  with ability to lean forwards to the floor)  Standing Balance: Minimal assistance (CGA AEB w walking but min A / CGA w/ standing balance activities)  Transfers  Surface: From chair with arms;To chair with arms  Additional Factors: Verbal cues;Hand placement cues;Increased time to

## 2024-01-05 NOTE — PLAN OF CARE
Problem: Discharge Planning  Goal: Discharge to home or other facility with appropriate resources  1/5/2024 1319 by Meagan Arzola RN  Outcome: Progressing  Flowsheets (Taken 1/5/2024 0826)  Discharge to home or other facility with appropriate resources: Identify barriers to discharge with patient and caregiver     Problem: Safety - Adult  Goal: Free from fall injury  Outcome: Progressing     Problem: Skin/Tissue Integrity  Goal: Absence of new skin breakdown  Description: 1.  Monitor for areas of redness and/or skin breakdown  2.  Assess vascular access sites hourly  3.  Every 4-6 hours minimum:  Change oxygen saturation probe site  4.  Every 4-6 hours:  If on nasal continuous positive airway pressure, respiratory therapy assess nares and determine need for appliance change or resting period.  Outcome: Progressing     Problem: ABCDS Injury Assessment  Goal: Absence of physical injury  Outcome: Progressing     Problem: Nutrition Deficit:  Goal: Optimize nutritional status  1/5/2024 1319 by Meagan Arzola RN  Outcome: Progressing     Problem: Pain  Goal: Verbalizes/displays adequate comfort level or baseline comfort level  1/5/2024 1319 by Meagan Arzola RN  Outcome: Progressing  Flowsheets (Taken 1/5/2024 0826)  Verbalizes/displays adequate comfort level or baseline comfort level:   Encourage patient to monitor pain and request assistance   Assess pain using appropriate pain scale

## 2024-01-05 NOTE — PLAN OF CARE
Problem: Discharge Planning  Goal: Discharge to home or other facility with appropriate resources    Discharge to home or other facility with appropriate resources: Identify barriers to discharge with patient and caregiver     Problem: Nutrition Deficit:  Goal: Optimize nutritional status    Nutrient intake appropriate for improving, restoring, or maintaining nutritional needs: Monitor oral intake, labs, and treatment plans     Problem: Pain  Goal: Verbalizes/displays adequate comfort level or baseline comfort level    Verbalizes/displays adequate comfort level or baseline comfort level:   Encourage patient to monitor pain and request assistance   Assess pain using appropriate pain scale   Implement non-pharmacological measures as appropriate and evaluate response

## 2024-01-06 LAB
GLUCOSE BLD-MCNC: 87 MG/DL (ref 70–99)
PERFORMED ON: NORMAL

## 2024-01-06 PROCEDURE — 6370000000 HC RX 637 (ALT 250 FOR IP): Performed by: PHYSICAL MEDICINE & REHABILITATION

## 2024-01-06 PROCEDURE — 1280000000 HC REHAB R&B

## 2024-01-06 PROCEDURE — 6360000002 HC RX W HCPCS: Performed by: PHYSICAL MEDICINE & REHABILITATION

## 2024-01-06 RX ADMIN — BISACODYL 5 MG: 5 TABLET, COATED ORAL at 08:44

## 2024-01-06 RX ADMIN — CARVEDILOL 25 MG: 12.5 TABLET, FILM COATED ORAL at 17:56

## 2024-01-06 RX ADMIN — CARVEDILOL 25 MG: 12.5 TABLET, FILM COATED ORAL at 08:45

## 2024-01-06 RX ADMIN — ASPIRIN 81 MG: 81 TABLET, CHEWABLE ORAL at 08:45

## 2024-01-06 RX ADMIN — LOSARTAN POTASSIUM 25 MG: 25 TABLET, FILM COATED ORAL at 08:45

## 2024-01-06 RX ADMIN — PANTOPRAZOLE SODIUM 40 MG: 40 TABLET, DELAYED RELEASE ORAL at 06:43

## 2024-01-06 RX ADMIN — ENOXAPARIN SODIUM 40 MG: 100 INJECTION SUBCUTANEOUS at 08:45

## 2024-01-06 RX ADMIN — ESCITALOPRAM 5 MG: 5 TABLET, FILM COATED ORAL at 08:45

## 2024-01-06 RX ADMIN — ATORVASTATIN CALCIUM 80 MG: 80 TABLET, FILM COATED ORAL at 20:49

## 2024-01-06 RX ADMIN — LOPERAMIDE HYDROCHLORIDE 2 MG: 2 CAPSULE ORAL at 20:50

## 2024-01-06 ASSESSMENT — PAIN SCALES - GENERAL: PAINLEVEL_OUTOF10: 0

## 2024-01-06 NOTE — PLAN OF CARE
Problem: Safety - Adult  Goal: Free from fall injury  1/6/2024 1521 by Luly Viera RN  Outcome: Progressing  Flowsheets (Taken 1/6/2024 1521)  Free From Fall Injury: Instruct family/caregiver on patient safety  Note: Bed/chair alarm remain in place. Call light in reach. Pt reminded to call for assistance prior to transferring.      Problem: Pain  Goal: Verbalizes/displays adequate comfort level or baseline comfort level  Outcome: Progressing  Flowsheets (Taken 1/6/2024 1521)  Verbalizes/displays adequate comfort level or baseline comfort level:   Encourage patient to monitor pain and request assistance   Assess pain using appropriate pain scale  Note: No c/o pain at this time.

## 2024-01-06 NOTE — PROGRESS NOTES
Department of Physical Medicine & Rehabilitation  Progress Note    Patient Identification:  Halina Rae  [id number]  : 1945  Admit date: 2023    Chief Complaint: Acute cerebrovascular accident (CVA) (HCC)    Subjective:   Seen in her bed this morning. She is tired and resting after therapy this week. No new pain overnight. She continues to be oriented but still have cog issues. Slept well last night. Will continue therapy Monday.       ROS: No f/c, n/v, cp    Objective:  Patient Vitals for the past 24 hrs:   BP Temp Temp src Pulse Resp SpO2   24 0815 (!) 163/63 97.8 °F (36.6 °C) Oral 62 16 97 %   24 (!) 146/67 98 °F (36.7 °C) Oral 71 16 98 %   24 1718 (!) 151/67 -- -- 60 -- --       Const: Alert. No distress, pleasant.   HEENT: Normocephalic, atraumatic. Normal sclera/conjunctiva. MMM.   CV: Regular rate and rhythm.   Resp: No respiratory distress. Lungs CTAB.   Abd: Soft, nontender, nondistended, NABS+   Ext: No edema.   Neuro: Alert, oriented, appropriately interactive.   Psych: Cooperative, appropriate mood and affect    Laboratory data: Available via EMR.   Last 24 hour lab  Recent Results (from the past 24 hour(s))   POCT Glucose    Collection Time: 24  7:52 AM   Result Value Ref Range    POC Glucose 87 70 - 99 mg/dl    Performed on ACCU-CHEK            Therapy progress:  PT  Position Activity Restriction  Other position/activity restrictions: up as tolerated  Objective     Sit to Stand: Contact guard assistance, Minimal Assistance (Both VC/TC for hand/foot placement)  Stand to Sit: Contact guard assistance (VC for hand placement)  Bed to Chair: Contact guard assistance, Minimal assistance (w.RW)  Device: Rolling Walker  Assistance: Minimal assistance, Contact guard assistance  Distance: 100' 20'( includes amb up and down a 10'ramp) 60')  OT  PT Equipment Recommendations  Equipment Needed: Yes  Mobility Devices: Walker  Walker: Rolling  Other: ongoing assessment at

## 2024-01-06 NOTE — PROGRESS NOTES
Pt alert and verbal, with confusion noted at times, but able to be reoriented. Up to chair this shift. Ambulated with CGA with RW and GB. Tolerates well. Pt needed to be reminded to call for assistance prior to transferring or ambulating to the BR. Continues with left sided neglect. Safety measures remain in place. Call light in reach. Denies any needs at this time.

## 2024-01-06 NOTE — PROGRESS NOTES
Pt. Remains A & O with confusion, pt. Asked what time dinner will be served, Rn oriented patient to time, pt. complained she did not get her dinner tray and did not eat dinner. Rn asked pt if she would like a snack, pt. Declined. Call light and over bed table within reach, pt educated on the use of the call light and when to call for assistance, needs reinforcement. Hourly rounding and frequent visual checks in place.

## 2024-01-07 VITALS
BODY MASS INDEX: 18.71 KG/M2 | TEMPERATURE: 98.1 F | DIASTOLIC BLOOD PRESSURE: 61 MMHG | SYSTOLIC BLOOD PRESSURE: 128 MMHG | WEIGHT: 116.4 LBS | OXYGEN SATURATION: 98 % | HEIGHT: 66 IN | HEART RATE: 60 BPM | RESPIRATION RATE: 16 BRPM

## 2024-01-07 PROCEDURE — 1280000000 HC REHAB R&B

## 2024-01-07 PROCEDURE — 6370000000 HC RX 637 (ALT 250 FOR IP): Performed by: PHYSICAL MEDICINE & REHABILITATION

## 2024-01-07 PROCEDURE — 6360000002 HC RX W HCPCS: Performed by: PHYSICAL MEDICINE & REHABILITATION

## 2024-01-07 RX ADMIN — ASPIRIN 81 MG: 81 TABLET, CHEWABLE ORAL at 10:23

## 2024-01-07 RX ADMIN — ENOXAPARIN SODIUM 40 MG: 100 INJECTION SUBCUTANEOUS at 10:23

## 2024-01-07 RX ADMIN — CARVEDILOL 25 MG: 12.5 TABLET, FILM COATED ORAL at 10:22

## 2024-01-07 RX ADMIN — ESCITALOPRAM 5 MG: 5 TABLET, FILM COATED ORAL at 10:23

## 2024-01-07 RX ADMIN — PANTOPRAZOLE SODIUM 40 MG: 40 TABLET, DELAYED RELEASE ORAL at 06:01

## 2024-01-07 RX ADMIN — ATORVASTATIN CALCIUM 80 MG: 80 TABLET, FILM COATED ORAL at 21:53

## 2024-01-07 RX ADMIN — LOSARTAN POTASSIUM 25 MG: 25 TABLET, FILM COATED ORAL at 10:22

## 2024-01-07 ASSESSMENT — PAIN SCALES - GENERAL: PAINLEVEL_OUTOF10: 0

## 2024-01-07 NOTE — PLAN OF CARE
Problem: Safety - Adult  Goal: Free from fall injury  Outcome: Progressing  Flowsheets (Taken 1/7/2024 1713)  Free From Fall Injury: Instruct family/caregiver on patient safety     Problem: Pain  Goal: Verbalizes/displays adequate comfort level or baseline comfort level  Outcome: Progressing  Flowsheets (Taken 1/7/2024 1713)  Verbalizes/displays adequate comfort level or baseline comfort level:   Encourage patient to monitor pain and request assistance   Assess pain using appropriate pain scale   Implement non-pharmacological measures as appropriate and evaluate response

## 2024-01-07 NOTE — PROGRESS NOTES
Department of Physical Medicine & Rehabilitation  Progress Note    Patient Identification:  Halina Rae  [id number]  : 1945  Admit date: 2023    Chief Complaint: Acute cerebrovascular accident (CVA) (HCC)    Subjective:   Seen in her room this morning. No enw complaints overnight. Slept well last night and is working hard in therapy. She is progressing daily. Will plan for DC in a few days with home help and family support.       ROS: No f/c, n/v, cp    Objective:  Patient Vitals for the past 24 hrs:   BP Temp Temp src Pulse Resp SpO2   24 1015 124/70 -- -- 86 -- --   24 0815 (!) 102/59 98.2 °F (36.8 °C) Oral 68 16 96 %   24 2040 (!) 146/64 98.1 °F (36.7 °C) Oral 61 16 97 %   24 1756 (!) 151/69 -- -- 60 -- --       Const: Alert. No distress, pleasant.   HEENT: Normocephalic, atraumatic. Normal sclera/conjunctiva. MMM.   CV: Regular rate and rhythm.   Resp: No respiratory distress. Lungs CTAB.   Abd: Soft, nontender, nondistended, NABS+   Ext: No edema.   Neuro: Alert, oriented, appropriately interactive.   Psych: Cooperative, appropriate mood and affect    Laboratory data: Available via EMR.   Last 24 hour lab  No results found for this or any previous visit (from the past 24 hour(s)).          Therapy progress:  PT  Position Activity Restriction  Other position/activity restrictions: up as tolerated  Objective     Sit to Stand: Contact guard assistance, Minimal Assistance (Both VC/TC for hand/foot placement)  Stand to Sit: Contact guard assistance (VC for hand placement)  Bed to Chair: Contact guard assistance, Minimal assistance (w.RW)  Device: Rolling Walker  Assistance: Minimal assistance, Contact guard assistance  Distance: 100' 20'( includes amb up and down a 10'ramp) 60')  OT  PT Equipment Recommendations  Equipment Needed: Yes  Mobility Devices: Walker  Walker: Rolling  Other: ongoing assessment at this time  Toilet - Technique: Ambulating  Equipment Used: Raised

## 2024-01-07 NOTE — PLAN OF CARE
Problem: Discharge Planning  Goal: Discharge to home or other facility with appropriate resources    Discharge to home or other facility with appropriate resources:   Identify barriers to discharge with patient and caregiver   Identify discharge learning needs (meds, wound care, etc)     Problem: Safety - Adult  Goal: Free from fall injury    Outcome: Progressing  Flowsheets (Taken 1/6/2024 1521)  Free From Fall Injury: Instruct family/caregiver on patient safety  Note: Bed/chair alarm remain in place. Call light in reach. Pt reminded to call for assistance prior to transferring.      Problem: Nutrition Deficit:  Goal: Optimize nutritional status    Nutrient intake appropriate for improving, restoring, or maintaining nutritional needs: Monitor oral intake, labs, and treatment plans     Problem: Pain  Goal: Verbalizes/displays adequate comfort level or baseline comfort level  1/7/2024 0053 by Kathryn Rosario, RN  Verbalizes/displays adequate comfort level or baseline comfort level:   Encourage patient to monitor pain and request assistance   Assess pain using appropriate pain scale

## 2024-01-08 LAB
ANION GAP SERPL CALCULATED.3IONS-SCNC: 6 MMOL/L (ref 3–16)
BASOPHILS # BLD: 0 K/UL (ref 0–0.2)
BASOPHILS NFR BLD: 0.9 %
BUN SERPL-MCNC: 12 MG/DL (ref 7–20)
CALCIUM SERPL-MCNC: 9.7 MG/DL (ref 8.3–10.6)
CHLORIDE SERPL-SCNC: 105 MMOL/L (ref 99–110)
CO2 SERPL-SCNC: 32 MMOL/L (ref 21–32)
CREAT SERPL-MCNC: 0.7 MG/DL (ref 0.6–1.2)
DEPRECATED RDW RBC AUTO: 13.5 % (ref 12.4–15.4)
EOSINOPHIL # BLD: 0.1 K/UL (ref 0–0.6)
EOSINOPHIL NFR BLD: 2.4 %
GFR SERPLBLD CREATININE-BSD FMLA CKD-EPI: >60 ML/MIN/{1.73_M2}
GLUCOSE SERPL-MCNC: 92 MG/DL (ref 70–99)
HCT VFR BLD AUTO: 34.1 % (ref 36–48)
HGB BLD-MCNC: 11.7 G/DL (ref 12–16)
LYMPHOCYTES # BLD: 1.4 K/UL (ref 1–5.1)
LYMPHOCYTES NFR BLD: 28.8 %
MCH RBC QN AUTO: 29.2 PG (ref 26–34)
MCHC RBC AUTO-ENTMCNC: 34.2 G/DL (ref 31–36)
MCV RBC AUTO: 85.5 FL (ref 80–100)
MONOCYTES # BLD: 0.5 K/UL (ref 0–1.3)
MONOCYTES NFR BLD: 10.2 %
NEUTROPHILS # BLD: 2.8 K/UL (ref 1.7–7.7)
NEUTROPHILS NFR BLD: 57.7 %
PLATELET # BLD AUTO: 163 K/UL (ref 135–450)
PMV BLD AUTO: 8.3 FL (ref 5–10.5)
POTASSIUM SERPL-SCNC: 3.7 MMOL/L (ref 3.5–5.1)
RBC # BLD AUTO: 3.99 M/UL (ref 4–5.2)
SODIUM SERPL-SCNC: 143 MMOL/L (ref 136–145)
WBC # BLD AUTO: 4.8 K/UL (ref 4–11)

## 2024-01-08 PROCEDURE — 1280000000 HC REHAB R&B

## 2024-01-08 PROCEDURE — 85025 COMPLETE CBC W/AUTO DIFF WBC: CPT

## 2024-01-08 PROCEDURE — 97129 THER IVNTJ 1ST 15 MIN: CPT

## 2024-01-08 PROCEDURE — 97535 SELF CARE MNGMENT TRAINING: CPT

## 2024-01-08 PROCEDURE — 97130 THER IVNTJ EA ADDL 15 MIN: CPT

## 2024-01-08 PROCEDURE — 97116 GAIT TRAINING THERAPY: CPT | Performed by: PHYSICAL THERAPIST

## 2024-01-08 PROCEDURE — 97530 THERAPEUTIC ACTIVITIES: CPT | Performed by: PHYSICAL THERAPIST

## 2024-01-08 PROCEDURE — 36415 COLL VENOUS BLD VENIPUNCTURE: CPT

## 2024-01-08 PROCEDURE — 6360000002 HC RX W HCPCS: Performed by: PHYSICAL MEDICINE & REHABILITATION

## 2024-01-08 PROCEDURE — 80048 BASIC METABOLIC PNL TOTAL CA: CPT

## 2024-01-08 PROCEDURE — 97530 THERAPEUTIC ACTIVITIES: CPT

## 2024-01-08 PROCEDURE — 6370000000 HC RX 637 (ALT 250 FOR IP): Performed by: PHYSICAL MEDICINE & REHABILITATION

## 2024-01-08 RX ORDER — LOSARTAN POTASSIUM 25 MG/1
25 TABLET ORAL DAILY
Qty: 30 TABLET | Refills: 3 | Status: SHIPPED | OUTPATIENT
Start: 2024-01-08

## 2024-01-08 RX ORDER — POLYETHYLENE GLYCOL 3350 17 G/17G
17 POWDER, FOR SOLUTION ORAL DAILY PRN
Qty: 527 G | Refills: 1 | Status: SHIPPED | OUTPATIENT
Start: 2024-01-08 | End: 2024-03-10

## 2024-01-08 RX ADMIN — ATORVASTATIN CALCIUM 80 MG: 80 TABLET, FILM COATED ORAL at 22:26

## 2024-01-08 RX ADMIN — ENOXAPARIN SODIUM 40 MG: 100 INJECTION SUBCUTANEOUS at 09:33

## 2024-01-08 RX ADMIN — ASPIRIN 81 MG: 81 TABLET, CHEWABLE ORAL at 09:33

## 2024-01-08 RX ADMIN — ESCITALOPRAM 5 MG: 5 TABLET, FILM COATED ORAL at 09:32

## 2024-01-08 RX ADMIN — CARVEDILOL 25 MG: 12.5 TABLET, FILM COATED ORAL at 17:19

## 2024-01-08 RX ADMIN — PANTOPRAZOLE SODIUM 40 MG: 40 TABLET, DELAYED RELEASE ORAL at 05:42

## 2024-01-08 RX ADMIN — LOSARTAN POTASSIUM 25 MG: 25 TABLET, FILM COATED ORAL at 09:32

## 2024-01-08 RX ADMIN — CARVEDILOL 25 MG: 12.5 TABLET, FILM COATED ORAL at 09:32

## 2024-01-08 ASSESSMENT — PAIN SCALES - GENERAL: PAINLEVEL_OUTOF10: 0

## 2024-01-08 NOTE — PROGRESS NOTES
pg    MCHC 34.2 31.0 - 36.0 g/dL    RDW 13.5 12.4 - 15.4 %    Platelets 163 135 - 450 K/uL    MPV 8.3 5.0 - 10.5 fL    Neutrophils % 57.7 %    Lymphocytes % 28.8 %    Monocytes % 10.2 %    Eosinophils % 2.4 %    Basophils % 0.9 %    Neutrophils Absolute 2.8 1.7 - 7.7 K/uL    Lymphocytes Absolute 1.4 1.0 - 5.1 K/uL    Monocytes Absolute 0.5 0.0 - 1.3 K/uL    Eosinophils Absolute 0.1 0.0 - 0.6 K/uL    Basophils Absolute 0.0 0.0 - 0.2 K/uL             Therapy progress:  PT  Position Activity Restriction  Other position/activity restrictions: up as tolerated  Objective     Sit to Stand: Contact guard assistance, Minimal Assistance (Both VC/TC for hand/foot placement)  Stand to Sit: Contact guard assistance (VC for hand placement)  Bed to Chair: Contact guard assistance, Minimal assistance (w.RW)  Device: Rolling Walker  Assistance: Minimal assistance, Contact guard assistance  Distance: 100' 20'( includes amb up and down a 10'ramp) 60')  OT  PT Equipment Recommendations  Equipment Needed: Yes  Mobility Devices: Walker  Walker: Rolling  Other: ongoing assessment at this time  Toilet - Technique: Ambulating  Equipment Used: Raised toilet seat with rails  Assessment        SLP          Body mass index is 17.61 kg/m².    Assessment and Plan:  1.  Acute thromboembolic right MCA stroke with gait instability              -Gait improving  - ASA  -lipitor  2.  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  Possible underlying paroxysmal A-fib              -coreg              - cozaar  4.  Hypertension  -monitor  5.  Depression  -lexapro 5mg     Rehab Progress: Improving  Anticipated Dispo: home  Services/DME: HH- family support   ELOS: 1/9    Prep DC today          Gerald Weaver D.O. M.P.H  PM&R  1/8/2024  9:19 AM

## 2024-01-08 NOTE — PROGRESS NOTES
Term Goal 5: Pt will perform simple iADL task with mod I- not addressed 1/8               Therapy Time   Individual Concurrent Group Co-treatment   Time In 0726         Time Out 0835         Minutes 69         Timed Code Treatment Minutes: 69 Minutes       Luly Navarro, OT

## 2024-01-08 NOTE — PLAN OF CARE
Problem: Discharge Planning  Goal: Discharge to home or other facility with appropriate resources    Discharge to home or other facility with appropriate resources:   Identify barriers to discharge with patient and caregiver   Identify discharge learning needs (meds, wound care, etc)     Problem: Nutrition Deficit:  Goal: Optimize nutritional status    Nutrient intake appropriate for improving, restoring, or maintaining nutritional needs: Monitor oral intake, labs, and treatment plans     Problem: Pain  Goal: Verbalizes/displays adequate comfort level or baseline comfort level  1/8/2024 0213 by Kathryn Rosario RN    Verbalizes/displays adequate comfort level or baseline comfort level:   Encourage patient to monitor pain and request assistance   Assess pain using appropriate pain scale   Implement non-pharmacological measures as appropriate and evaluate response

## 2024-01-08 NOTE — CARE COORDINATION
Case Management Assessment            Discharge Note                    Date / Time of Note: 1/8/2024 4:00 PM                  Discharge Note Completed by: JAC Cooper    Patient Name: Halina Rae   YOB: 1945  Diagnosis: CVA (cerebral vascular accident) (HCC) [I63.9]  Acute cerebrovascular accident (CVA) (HCC) [I63.9]   Date / Time: 12/28/2023 10:00 PM    Current PCP: Simone Vera MD  Clinic patient: Yes    Hospitalization in the last 30 days: Yes       Advance Directives:  Code Status: Full Code  [place] DNR form completed and on chart: Not Indicated    Financial:  Payor: [employer] MEDICARE / Plan: [employer] GOLD PLUS HMO / Product Type: *No Product type* /      Pharmacy:    [facility] [facility], [place] - [address] - P [phone] - F [phone]  [address]  [facility] [address]  Phone: [phone] Fax: [phone]      Assistance purchasing medications?:    Assistance provided by Case Management: None at this time    Does patient want to participate in local refill/ meds to beds program?: Yes    Meds To Beds General Rules:  1. Can ONLY be done Monday- Friday between 8:30am-5pm  2. Prescription(s) must be in pharmacy by 3pm to be filled same day  3.Copy of patient's insurance/ prescription drug card and patient face sheet must be sent along with the prescription(s)  4. Cost of Rx cannot be added to hospital bill. If financial assistance is needed, please contact unit  or ;  or  CANNOT provide pharmacy voucher for patients co-pays  5. Patients can then  the prescription on their way out of the hospital at discharge, or pharmacy can deliver to the bedside if staff is available. (payment due at time of pick-up or delivery - cash, check, or card accepted)     Able to afford home medications/ co-pay costs: Yes    ADLS:  Current PT AM-PAC Score:   /24  Current OT AM-PAC Score:   /24      DISCHARGE Disposition: Home

## 2024-01-08 NOTE — PROGRESS NOTES
Independent  Supine to Sit  Assistance Level: Independent  Scooting  Assistance Level: Independent  Balance  Sitting Balance: Independent (AEB doning shoes with ability to reach to the floor)  Standing Balance: Modified independent  ([facility] use of RW)  Transfers  Surface: To bed;To chair with arms;From bed;From chair with arms  Sit to Stand  Assistance Level: Independent  Stand to Sit  Assistance Level: Independent  Bed To/From Chair  Technique: Stand step  Assistance Level: Independent  Car Transfer  Assistance Level: Independent      Environmental Mobility  Ambulation  Surface: Level surface  Device: Rolling walker (No AD)  Distance: ~550' x2 ( including a ramp x70' in each direction)  160', ( w/o ' icluded amb up and down a ramp )  Assistance Level: Supervision  Gait Deviations: Decreased step length bilateral  Skilled Clinical Factors: Intermittent VC for clearing environmental barriers on L  Stairs  Stair Height: 6''  Device: One handrail (R HR with up and down steps)  Number of Stairs: 17 (8+9)  Additional Factors: Non-reciprocal going up;Non-reciprocal going down  Assistance Level: Modified independent  Skilled Clinical Factors: Pt leads with LLE for both amb up and down steps.  PT instructed pt with the following sitting ex;   1. TR/HR   2. LAQS  3. Marching    Sixto 10 reps of each to BLES                   ASSESSMENT/PROGRESS TOWARDS GOALS       Assessment  Assessment: Pt has progressed well since intial eval.  Pt still presents with impaired L sided awareness/ w/ visual  L neglect, affecting her mobility.  She also demonstrates delayed processing. L side unawareness is obvious w/walking.  Pt Is Ind with transfers and bed mobility but req S for amb 2/2 to the safety concerns. Pt achieved 3/4 goals previusly set.  Pt is below baseline and would benefit from cont therapy to maximize potential and increase functional mobility towards Ind.  Activity Tolerance: Patient tolerated treatment well  Discharge

## 2024-01-08 NOTE — PROGRESS NOTES
ACUTE REHAB UNIT  SPEECH/LANGUAGE PATHOLOGY      [x] Daily  [] Weekly Care Conference Note  [x] Discharge    Patient:Halina Rae      :1945  MRN:[id number]  Rehab Dx/Hx: CVA (cerebral vascular accident) ([facility]) [I63.9]  Acute cerebrovascular accident (CVA) (HCC) [I63.9]    Precautions: [] Aspiration  [x] Fall risk  [] Sternal  [] Seizure [] Hip  [] Weight Bearing [] Other     ST Dx: [] Aphasia  [] Dysarthria  [] Apraxia   [] Oropharyngeal dysphagia [x] Cognitive Impairment  [] Other:   Date of Admit: 2023  Room #: 3105/3105-01  Date: 2024          Current Diet Order:ADULT DIET; Regular  ADULT ORAL NUTRITION SUPPLEMENT; Dinner; Frozen Oral Supplement   Lives With: Daughter  Education: 11th grade education  Occupation: Part time employment  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 Needs consistent cues to attend past midline and shows very poor awareness to objects on L side)  Hearing  Hearing: Exceptions to WFL  Hearing Exceptions: Hard of hearing/hearing concerns (needs things repeated at times)  Barriers toward progress: Cognitive deficit        Date: 2024       Tx session 1 Tx session 2 Discharge Summary   Total Timed Code Min 30 30    Total Treatment Minutes 30 30    Individual Treatment Minutes 30 30    Group Treatment Minutes 0 0    Co-Treat Minutes 0 0    Brief Exception: N/A N/A    Pain None reported None reported     Pain Intervention: [] RN notified  [] Repositioned  [] Intervention offered and patient declined  [x] N/A  [] Other: [] RN notified  [] Repositioned  [] Intervention offered and patient declined  [x] N/A  [] Other:    Subjective:     Pt upright in chair and agreeable to tx session.  Pt upright in chair upon entry and agreeable to tx session.     Objective / Goals:      Pt will demonstrate improved

## 2024-01-08 NOTE — PROGRESS NOTES
Shift assessment completed, vital obtained BP (!) 160/68   Pulse 61   Temp 97.4 °F (36.3 °C) (Oral)   Resp 16   Ht 1.676 m (5' 6\")   Wt 49.5 kg (109 lb 2 oz)   SpO2 97%   BMI 17.61 kg/m² Patient participated in therapy without complication, plan to discharge to home tomorrow. Call light and personal items within reach, safety measures in place.

## 2024-01-08 NOTE — PROGRESS NOTES
ARU Discharge Assessment    Transportation  \"Has lack of transportation kept you from medical appointments, meetings, work, or from getting things needed for daily living?\"Check all that apply:  [] A.  Yes, it has kept me from medical appointments or from getting my medications  [] B.  Yes, it has kept me from non-medical meetings, appointments, work, or from getting things that I need  [x] C.  No  [] X.  Patient unable to respond  [] Y.  Patient declines to respond    Provision of Current Reconciled Medication List to Subsequent Provider at Discharge  [] No, current reconciled medication list not provided to the subsequent provider.  [x] Yes, current reconciled medication list provided to the subsequent provider. (**Select route of transmission below**)   [x] Via Electronic Health Record   [] Via Health Information Exchange Organization  [] Verbal (e.g. in person, telephone, video conferencing)  [] Paper-based (e.g. fax, copies, printouts)   [] Other Methods (e.g. texting, email, CDs)    Provision of Current Reconciled Medication List to Patient at Discharge  [] No, current reconciled medication list not provided to the patient, family and/or caregiver.   [x] Yes, current reconciled medication list provided to the patient, family and/or caregiver.  (**Select route of transmission below**)   [x] Via Electronic Health Record (e.g., electronic access to patient portal)   [] Via Health Information Exchange Organization  [x] Verbal (e.g. in person, telephone, video conferencing)  [x] Paper-based (e.g. fax, copies, printouts)   [] Other Methods (e.g. texting, email, CDs)    Health Literacy  \"How often do you need to have someone help you when you read instructions, pamphlets, or other written material from your doctor or pharmacy?\"  [x]  0.  Never  []  1.  Rarely  []  2.  Sometimes  []  3.  Often  []  4.  Always  []  7.  Patient declines to respond  []  8.  Patient unable to respond    BIMS - **Must be completed in the

## 2024-01-08 NOTE — DISCHARGE INSTR - COC
Continuity of Care Form    Patient Name: Halina Raymond   :  1945  MRN:  [id number]    Admit date:  2023  Discharge date:  24    Code Status Order: Full Code   Advance Directives:     Admitting Physician:  Gerald Weaver DO  PCP: Simone Vera MD    Discharging Nurse: Meagan Kaur [facility] Unit/Room#: 3105/3105-01  Discharging Unit Phone Number: [phone]    Emergency Contact:   Extended Emergency Contact Information  Primary Emergency Contact: Elias Franco  Home Phone: [phone]  Relation: Brother/Sister  Secondary Emergency Contact: betsey raymond  Mobile Phone: [phone]  Relation: Child    Past Surgical History:  Past Surgical History:   Procedure Laterality Date    APPENDECTOMY         Immunization History:   Immunization History   Administered Date(s) Administered    COVID-19, MODERNA BLUE border, Primary or Immunocompromised, (age 12y+), IM, 100 mcg/0.5mL 2021, 2021    COVID-19, PFIZER Bivalent, DO NOT Dilute, (age 12y+), IM, 30 mcg/0.3 mL 2022    COVID-19, PFIZER PURPLE top, DILUTE for use, (age 12 y+), 30mcg/0.3mL 2021       Active Problems:  Patient Active Problem List   Diagnosis Code    Cerebrovascular accident (CVA) due to embolism of right middle cerebral artery (HCC) I63.411    Altered mental status R41.82    Encounter for loop recorder check Z45.09    Acute cerebrovascular accident (CVA) (HCC) I63.9       Isolation/Infection:   Isolation            No Isolation          Patient Infection Status       None to display            Nurse Assessment:  Last Vital Signs: /61   Pulse 60   Temp 98.1 °F (36.7 °C) (Oral)   Resp 16   Ht 1.676 m (5' 6\")   Wt 49.5 kg (109 lb 2 oz)   SpO2 98%   BMI 17.61 kg/m²     Last documented pain score (0-10 scale): Pain Level: 0  Last Weight:   Wt Readings from Last 1 Encounters:   24 49.5 kg (109 lb 2 oz)     Mental Status:  oriented and alert    IV Access:  - None    Nursing

## 2024-01-08 NOTE — PATIENT CARE CONFERENCE
intensive and coordinated interdisciplinary team approach to the delivery of rehabilitative care    Medical Necessity-continued close physician medical management is required for:   [] Cardiac/Circulatory dysfunction  [] Respiratory/Pulmonary dysfunction  [] Integumentary complications  [] Peripheral Vascular dysfunction  [] Musculoskeletal dysfunction  [x] Neurological dysfunction d/t:  [x] CVA  [] SCI  [] TBI  [] Other: __________  [] Renal dysfunction  [] Hematologic dysfunction    [] Endocrine disorders  [] GI disorders     [] Genito-Urinary dysfunction    Assessment/Plan:  [x] The patient is making good progression towards their LTG's, is actively participating in, and has a reasonable expectation to continue to benefit from the intensive rehabilitation program.  [] The estimated discharge date has been changed from initial team conference due to:   [] The estimated discharge destination has been changed from initial team conference due to:     Rehab Team Members in attendance for Team Conference:  ARU Supervisor/PPS Coordinator:  Obey Solis PT, DPT    Therapy Manager/ARU :  Siobhan Riley PT, DPT    Social Work:  Alfredo Ray    Nursing:  Meagan Arzoal, RN    Therapy:  Rosy Martinez, PT  Annabella Ritter, OTR/JESSE Palacio MA-CCC, SLP    I approve the established interdisciplinary plan of care as documented within the medical record of Halina Rae.    MD Maricel Weaver D.O. M.P.H  PM&R  1/9/2024  11:25 AM

## 2024-01-08 NOTE — PLAN OF CARE
Problem: Discharge Planning  Goal: Discharge to home or other facility with appropriate resources  1/8/2024 1056 by Princess Fisher RN  Outcome: Progressing  Flowsheets (Taken 1/5/2024 2012 by Sheryl Car, RN)  Discharge to home or other facility with appropriate resources:   Identify barriers to discharge with patient and caregiver   Identify discharge learning needs (meds, wound care, etc)     Problem: Safety - Adult  Goal: Free from fall injury  Outcome: Progressing  Flowsheets (Taken 1/7/2024 1713 by Luly Viera, RN)  Free From Fall Injury: Instruct family/caregiver on patient safety     Problem: Skin/Tissue Integrity  Goal: Absence of new skin breakdown  Description: 1.  Monitor for areas of redness and/or skin breakdown  2.  Assess vascular access sites hourly  3.  Every 4-6 hours minimum:  Change oxygen saturation probe site  4.  Every 4-6 hours:  If on nasal continuous positive airway pressure, respiratory therapy assess nares and determine need for appliance change or resting period.  Outcome: Progressing     Problem: ABCDS Injury Assessment  Goal: Absence of physical injury  Outcome: Progressing  Flowsheets (Taken 1/3/2024 0719 by Meagan Arzola, RN)  Absence of Physical Injury: Implement safety measures based on patient assessment     Problem: Nutrition Deficit:  Goal: Optimize nutritional status  1/8/2024 1056 by Princess Fisher RN  Outcome: Progressing  Flowsheets (Taken 1/3/2024 0719 by Meagan Arzola, RN)  Nutrient intake appropriate for improving, restoring, or maintaining nutritional needs: Monitor oral intake, labs, and treatment plans     Problem: Pain  Goal: Verbalizes/displays adequate comfort level or baseline comfort level  1/8/2024 1056 by Princess Fisher RN  Outcome: Progressing  Flowsheets (Taken 1/8/2024 0915)  Verbalizes/displays adequate comfort level or baseline comfort level: Encourage patient to monitor pain and request assistance

## 2024-01-09 VITALS
SYSTOLIC BLOOD PRESSURE: 132 MMHG | HEIGHT: 66 IN | DIASTOLIC BLOOD PRESSURE: 67 MMHG | TEMPERATURE: 97.4 F | BODY MASS INDEX: 17.54 KG/M2 | RESPIRATION RATE: 16 BRPM | HEART RATE: 63 BPM | WEIGHT: 109.13 LBS | OXYGEN SATURATION: 97 %

## 2024-01-09 PROCEDURE — 6370000000 HC RX 637 (ALT 250 FOR IP): Performed by: PHYSICAL MEDICINE & REHABILITATION

## 2024-01-09 PROCEDURE — 6360000002 HC RX W HCPCS: Performed by: PHYSICAL MEDICINE & REHABILITATION

## 2024-01-09 RX ADMIN — LOSARTAN POTASSIUM 25 MG: 25 TABLET, FILM COATED ORAL at 08:32

## 2024-01-09 RX ADMIN — ESCITALOPRAM 5 MG: 5 TABLET, FILM COATED ORAL at 08:32

## 2024-01-09 RX ADMIN — CARVEDILOL 25 MG: 12.5 TABLET, FILM COATED ORAL at 17:58

## 2024-01-09 RX ADMIN — ASPIRIN 81 MG: 81 TABLET, CHEWABLE ORAL at 08:32

## 2024-01-09 RX ADMIN — CARVEDILOL 25 MG: 12.5 TABLET, FILM COATED ORAL at 08:32

## 2024-01-09 RX ADMIN — PANTOPRAZOLE SODIUM 40 MG: 40 TABLET, DELAYED RELEASE ORAL at 07:55

## 2024-01-09 RX ADMIN — ENOXAPARIN SODIUM 40 MG: 100 INJECTION SUBCUTANEOUS at 08:32

## 2024-01-09 ASSESSMENT — PAIN SCALES - GENERAL: PAINLEVEL_OUTOF10: 0

## 2024-01-09 NOTE — PROGRESS NOTES
Shift assessment complete. VSS. A&Ox4. Denies pain at this time. Fall precautions in place. Patient up to chair for breakfast, chair alarm utilized, call light within reach. Patient reported no new complaints at this time. Patient reported that she is excited to discharge home later today.    Vitals:    01/09/24 0830   BP: (!) 127/58   Pulse: 68   Resp: 16   Temp: 98.1 °F (36.7 °C)   SpO2: 97%

## 2024-01-09 NOTE — PROGRESS NOTES
Department of Physical Medicine & Rehabilitation  Progress Note    Patient Identification:  Halina Rae  [id number]  : 1945  Admit date: 2023    Chief Complaint: Acute cerebrovascular accident (CVA) (HCC)    Subjective:   Seen in her room this morning. She is getting ready for DC today. Slept well last night. She is getting a walker today and feels excited to go home. Slept well last night.   Team conference today.     ROS: No f/c, n/v, cp    Objective:  Patient Vitals for the past 24 hrs:   BP Temp Temp src Pulse Resp SpO2   24 0830 (!) 127/58 98.1 °F (36.7 °C) Oral 68 16 97 %   24 2215 (!) 145/69 98 °F (36.7 °C) Oral 59 18 98 %   24 1719 (!) 160/68 -- -- 61 -- --       Const: Alert. No distress, pleasant.   HEENT: Normocephalic, atraumatic. Normal sclera/conjunctiva. MMM.   CV: Regular rate and rhythm.   Resp: No respiratory distress. Lungs CTAB.   Abd: Soft, nontender, nondistended, NABS+   Ext: No edema.   Neuro: Alert, oriented, appropriately interactive.   Psych: Cooperative, appropriate mood and affect    Laboratory data: Available via EMR.   Last 24 hour lab  No results found for this or any previous visit (from the past 24 hour(s)).            Therapy progress:  PT  Position Activity Restriction  Other position/activity restrictions: up as tolerated  Objective     Sit to Stand: Contact guard assistance, Minimal Assistance (Both VC/TC for hand/foot placement)  Stand to Sit: Contact guard assistance (VC for hand placement)  Bed to Chair: Contact guard assistance, Minimal assistance (w.RW)  Device: Rolling Walker  Assistance: Minimal assistance, Contact guard assistance  Distance: 100' 20'( includes amb up and down a 10'ramp) 60')  OT  PT Equipment Recommendations  Equipment Needed: Yes  Mobility Devices: Walker  Walker: Rolling  Other: RW recommended  Toilet - Technique: Ambulating  Equipment Used: Raised toilet seat with rails  Assessment        SLP          Body mass index

## 2024-01-09 NOTE — PROGRESS NOTES
Pt  awake watching television in bed. Alert & oriented x 4. BP elevated, other VSS. Assessment completed. No complaints at this time. Nighttime medications given whole in applesauce, pt tolerated well. Reminded pt to call for assistance with any needs. Call light within reach. Safety measures in place.

## 2024-01-09 NOTE — PLAN OF CARE
Problem: Discharge Planning  Goal: Discharge to home or other facility with appropriate resources  1/9/2024 1024 by Meagan Arzola RN  Outcome: Progressing  Flowsheets (Taken 1/9/2024 0830)  Discharge to home or other facility with appropriate resources: Identify barriers to discharge with patient and caregiver     Problem: Safety - Adult  Goal: Free from fall injury  1/9/2024 1024 by Meagan Arzola RN  Outcome: Progressing     Problem: Skin/Tissue Integrity  Goal: Absence of new skin breakdown  Description: 1.  Monitor for areas of redness and/or skin breakdown  2.  Assess vascular access sites hourly  3.  Every 4-6 hours minimum:  Change oxygen saturation probe site  4.  Every 4-6 hours:  If on nasal continuous positive airway pressure, respiratory therapy assess nares and determine need for appliance change or resting period.  1/9/2024 1024 by Meagan Arzola RN  Outcome: Progressing     Problem: ABCDS Injury Assessment  Goal: Absence of physical injury  1/9/2024 1024 by Meagan Arzola RN  Outcome: Progressing

## 2024-01-09 NOTE — CARE COORDINATION
[facility]    Patient aware and agreeable to services. Faxed orders to [facility] for SOC by 1/11    Manju Adams LPN  Care Transition Nurse  [facility]  [phone]

## 2024-01-09 NOTE — PLAN OF CARE
Problem: Discharge Planning  Goal: Discharge to home or other facility with appropriate resources  Outcome: Adequate for Discharge  Flowsheets (Taken 1/9/2024 0812)  Discharge to home or other facility with appropriate resources: Identify barriers to discharge with patient and caregiver     Problem: Safety - Adult  Goal: Free from fall injury  Outcome: Progressing  Flowsheets (Taken 1/9/2024 0812)  Free From Fall Injury: Instruct family/caregiver on patient safety     Problem: Skin/Tissue Integrity  Goal: Absence of new skin breakdown  Description: 1.  Monitor for areas of redness and/or skin breakdown  Outcome: Adequate for Discharge  Note: No skin breakdown observed.     Problem: ABCDS Injury Assessment  Goal: Absence of physical injury  Flowsheets (Taken 1/9/2024 0812)  Absence of Physical Injury: Implement safety measures based on patient assessment  Note: Pt had to be reminded not to get up without assistance.      Problem: Nutrition Deficit:  Goal: Optimize nutritional status  Outcome: Adequate for Discharge  Flowsheets (Taken 1/9/2024 0812)  Nutrient intake appropriate for improving, restoring, or maintaining nutritional needs: Assess nutritional status and recommend course of action     Problem: Pain  Goal: Verbalizes/displays adequate comfort level or baseline comfort level  Outcome: Adequate for Discharge  Flowsheets (Taken 1/9/2024 0812)  Verbalizes/displays adequate comfort level or baseline comfort level: Encourage patient to monitor pain and request assistance  Note: Pt had no complaints of pain during shift.

## 2024-01-10 NOTE — DISCHARGE SUMMARY
Physical Medicine & Rehabilitation  Discharge Summary     Patient Identification:  Halina Rae  : 1945  Admit date: 2023  Discharge date: 2024  Attending provider: No att. providers found        Primary care provider: Simone Vera MD     Discharge Diagnoses:   Patient Active Problem List   Diagnosis    Cerebrovascular accident (CVA) due to embolism of right middle cerebral artery (HCC)    Altered mental status    Encounter for loop recorder check    Acute cerebrovascular accident (CVA) (HCC)       Discharge Functional Status:      Physical therapy:  Supine to Sit: Independent  Sit to Supine: Independent      Sit to Stand: Independent  Chair/Bed to Chair Transfer: Independent  Car Transfer: Independent     Ambulation 10 ft: Supervision or touching assistance  Ambulation 50 ft: Supervision or touching assistance  Ambulation 150 ft: Supervision or touching assistance  Stairs - 1 Step: Independent  Stairs - 4 Step: Independent  Stairs - 12 Step: Independent    Occupational therapy:  Eating: Independent  Oral Hygiene: Independent  Bathing: Supervision or touching assistance  Upper Body Dressing: Independent  Lower Body Dressing: Supervision or touching assistance     Toilet Transfer: Independent  Toilet Hygiene: Independent    Speech therapy:    Pt presents with moderate-severe cognitive-linguistic impairment s/p R MCA CVA. Pt was alert, pleasant, and agreeable to SLP visit. Oriented to person, place, situation, but unable to determine correct date, month or year independently. Pt was able to answer all social/functional questions but suspect requires confirmation from family members. Pt demo'd impairments in the area of initiation, impulsivity, attention, and ability to self-monitor/correct. Pt also demonstrated severe executive dysfunction. Pt's initiation and impulsivity was inconsistent throughout session as pt with times of delayed verbal responses and then impulsivity was exhibited when pt

## 2024-01-15 ENCOUNTER — NURSE ONLY (OUTPATIENT)
Dept: CARDIOLOGY CLINIC | Age: 79
End: 2024-01-15

## 2024-01-15 DIAGNOSIS — I63.411 CEREBROVASCULAR ACCIDENT (CVA) DUE TO EMBOLISM OF RIGHT MIDDLE CEREBRAL ARTERY (HCC): ICD-10-CM

## 2024-01-15 DIAGNOSIS — Z45.09 ENCOUNTER FOR LOOP RECORDER CHECK: Primary | ICD-10-CM

## 2024-01-15 NOTE — PROGRESS NOTES
Patient comes in for a 1 week wound and programming evaluation s/p MDT LNQ22 LINQII implanted by Dr. Sutton on 12.27.2023 for CVA.  Pt accompanied by SUDEEP Hyman.     Remote monitoring not active.  Appears monitor provided at implant.  Pt states never received.  New monitor ordered today.  Discussed initial setup, remote monitoring, frequencies and communications.  Relay literature and AVS provided today.    Outer dressing and SS removed from 4th ICS chest device site prior to today's visit.  Incision appears well approximated, CDI..  Reviewed post op wound care and restrictions.  Pt informed to call office if she develops any fever, chills, increased swelling, redness or drainage from site.  Pt voiced an understanding.    Please see MEDIA TAB for interrogation.  Patient will follow up in 6 months w/EPNP.  Once monitor setup is complete, we will monitor remotely.

## 2024-02-05 ENCOUNTER — TELEPHONE (OUTPATIENT)
Dept: CARDIOLOGY CLINIC | Age: 79
End: 2024-02-05

## 2024-02-05 NOTE — TELEPHONE ENCOUNTER
Spoke with pt's grandson, Boogie (on HIPAA), pt not available. Relayed new AF seen on ILR. Scheduled OV with KARRIE to discuss OAC on 2/8/24.

## 2024-02-05 NOTE — TELEPHONE ENCOUNTER
[facility] transmission shows possible new AF, no OAC. Report sent to [facility] for review via Murj. Please see Murj for additional details.

## 2024-02-06 ENCOUNTER — APPOINTMENT (OUTPATIENT)
Dept: GENERAL RADIOLOGY | Age: 79
End: 2024-02-06
Payer: MEDICARE

## 2024-02-06 ENCOUNTER — HOSPITAL ENCOUNTER (EMERGENCY)
Age: 79
Discharge: HOME | End: 2024-02-07
Attending: EMERGENCY MEDICINE
Payer: MEDICARE

## 2024-02-06 DIAGNOSIS — M17.0 OSTEOARTHRITIS OF BOTH KNEES, UNSPECIFIED OSTEOARTHRITIS TYPE: Primary | ICD-10-CM

## 2024-02-06 DIAGNOSIS — S20.212A CONTUSION OF LEFT CHEST WALL, INITIAL ENCOUNTER: ICD-10-CM

## 2024-02-06 LAB
ANION GAP SERPL CALCULATED.3IONS-SCNC: 11 MMOL/L (ref 3–16)
BASOPHILS # BLD: 0 K/UL (ref 0–0.2)
BASOPHILS NFR BLD: 0.7 %
BUN SERPL-MCNC: 14 MG/DL (ref 7–20)
CALCIUM SERPL-MCNC: 9.9 MG/DL (ref 8.3–10.6)
CHLORIDE SERPL-SCNC: 100 MMOL/L (ref 99–110)
CO2 SERPL-SCNC: 27 MMOL/L (ref 21–32)
CREAT SERPL-MCNC: 0.7 MG/DL (ref 0.6–1.2)
DEPRECATED RDW RBC AUTO: 14.3 % (ref 12.4–15.4)
EOSINOPHIL # BLD: 0 K/UL (ref 0–0.6)
EOSINOPHIL NFR BLD: 0.6 %
GFR SERPLBLD CREATININE-BSD FMLA CKD-EPI: >60 ML/MIN/{1.73_M2}
GLUCOSE SERPL-MCNC: 101 MG/DL (ref 70–99)
HCT VFR BLD AUTO: 35.4 % (ref 36–48)
HGB BLD-MCNC: 11.7 G/DL (ref 12–16)
LACTATE BLDV-SCNC: 0.9 MMOL/L (ref 0.4–1.9)
LYMPHOCYTES # BLD: 0.9 K/UL (ref 1–5.1)
LYMPHOCYTES NFR BLD: 14.5 %
MCH RBC QN AUTO: 28.5 PG (ref 26–34)
MCHC RBC AUTO-ENTMCNC: 33.2 G/DL (ref 31–36)
MCV RBC AUTO: 85.9 FL (ref 80–100)
MONOCYTES # BLD: 0.8 K/UL (ref 0–1.3)
MONOCYTES NFR BLD: 12.4 %
NEUTROPHILS # BLD: 4.5 K/UL (ref 1.7–7.7)
NEUTROPHILS NFR BLD: 71.8 %
PLATELET # BLD AUTO: 153 K/UL (ref 135–450)
PMV BLD AUTO: 9.1 FL (ref 5–10.5)
POTASSIUM SERPL-SCNC: 3.7 MMOL/L (ref 3.5–5.1)
RBC # BLD AUTO: 4.12 M/UL (ref 4–5.2)
SODIUM SERPL-SCNC: 138 MMOL/L (ref 136–145)
WBC # BLD AUTO: 6.3 K/UL (ref 4–11)

## 2024-02-06 PROCEDURE — 85025 COMPLETE CBC W/AUTO DIFF WBC: CPT

## 2024-02-06 PROCEDURE — 83605 ASSAY OF LACTIC ACID: CPT

## 2024-02-06 PROCEDURE — 80048 BASIC METABOLIC PNL TOTAL CA: CPT

## 2024-02-06 PROCEDURE — 36415 COLL VENOUS BLD VENIPUNCTURE: CPT

## 2024-02-06 PROCEDURE — 99284 EMERGENCY DEPT VISIT MOD MDM: CPT

## 2024-02-06 PROCEDURE — 73560 X-RAY EXAM OF KNEE 1 OR 2: CPT

## 2024-02-06 RX ORDER — ACETAMINOPHEN 325 MG/1
650 TABLET ORAL ONCE
Status: DISCONTINUED | OUTPATIENT
Start: 2024-02-06 | End: 2024-02-07 | Stop reason: HOSPADM

## 2024-02-07 ENCOUNTER — OFFICE VISIT (OUTPATIENT)
Dept: CARDIOLOGY CLINIC | Age: 79
End: 2024-02-07
Payer: MEDICARE

## 2024-02-07 VITALS
SYSTOLIC BLOOD PRESSURE: 138 MMHG | HEART RATE: 78 BPM | DIASTOLIC BLOOD PRESSURE: 74 MMHG | OXYGEN SATURATION: 97 % | WEIGHT: 114.9 LBS | HEIGHT: 65 IN | BODY MASS INDEX: 19.14 KG/M2 | RESPIRATION RATE: 20 BRPM | TEMPERATURE: 98.8 F

## 2024-02-07 VITALS
DIASTOLIC BLOOD PRESSURE: 70 MMHG | SYSTOLIC BLOOD PRESSURE: 130 MMHG | HEART RATE: 82 BPM | WEIGHT: 110 LBS | BODY MASS INDEX: 18.3 KG/M2

## 2024-02-07 DIAGNOSIS — I63.411 CEREBROVASCULAR ACCIDENT (CVA) DUE TO EMBOLISM OF RIGHT MIDDLE CEREBRAL ARTERY (HCC): ICD-10-CM

## 2024-02-07 DIAGNOSIS — I48.0 PAROXYSMAL ATRIAL FIBRILLATION (HCC): Primary | ICD-10-CM

## 2024-02-07 DIAGNOSIS — I10 BENIGN ESSENTIAL HTN: ICD-10-CM

## 2024-02-07 LAB
BACTERIA URNS QL MICRO: ABNORMAL /HPF
BILIRUB UR QL STRIP.AUTO: NEGATIVE
CLARITY UR: CLEAR
COLOR UR: YELLOW
EPI CELLS #/AREA URNS HPF: ABNORMAL /HPF (ref 0–5)
GLUCOSE UR STRIP.AUTO-MCNC: NEGATIVE MG/DL
HGB UR QL STRIP.AUTO: ABNORMAL
HYALINE CASTS #/AREA URNS LPF: ABNORMAL /LPF (ref 0–2)
KETONES UR STRIP.AUTO-MCNC: NEGATIVE MG/DL
LEUKOCYTE ESTERASE UR QL STRIP.AUTO: NEGATIVE
MUCOUS THREADS #/AREA URNS LPF: ABNORMAL /LPF
NITRITE UR QL STRIP.AUTO: NEGATIVE
PH UR STRIP.AUTO: 5.5 [PH] (ref 5–8)
PROT UR STRIP.AUTO-MCNC: NEGATIVE MG/DL
RBC #/AREA URNS HPF: ABNORMAL /HPF (ref 0–4)
SP GR UR STRIP.AUTO: >=1.03 (ref 1–1.03)
UA COMPLETE W REFLEX CULTURE PNL UR: ABNORMAL
UA DIPSTICK W REFLEX MICRO PNL UR: YES
URN SPEC COLLECT METH UR: ABNORMAL
UROBILINOGEN UR STRIP-ACNC: 1 E.U./DL
WBC #/AREA URNS HPF: ABNORMAL /HPF (ref 0–5)

## 2024-02-07 PROCEDURE — 1036F TOBACCO NON-USER: CPT

## 2024-02-07 PROCEDURE — G8400 PT W/DXA NO RESULTS DOC: HCPCS

## 2024-02-07 PROCEDURE — G8484 FLU IMMUNIZE NO ADMIN: HCPCS

## 2024-02-07 PROCEDURE — 1090F PRES/ABSN URINE INCON ASSESS: CPT

## 2024-02-07 PROCEDURE — 99214 OFFICE O/P EST MOD 30 MIN: CPT

## 2024-02-07 PROCEDURE — G8427 DOCREV CUR MEDS BY ELIG CLIN: HCPCS

## 2024-02-07 PROCEDURE — 1111F DSCHRG MED/CURRENT MED MERGE: CPT

## 2024-02-07 PROCEDURE — 81001 URINALYSIS AUTO W/SCOPE: CPT

## 2024-02-07 PROCEDURE — 3075F SYST BP GE 130 - 139MM HG: CPT

## 2024-02-07 PROCEDURE — 3078F DIAST BP <80 MM HG: CPT

## 2024-02-07 PROCEDURE — 93000 ELECTROCARDIOGRAM COMPLETE: CPT

## 2024-02-07 PROCEDURE — 1123F ACP DISCUSS/DSCN MKR DOCD: CPT

## 2024-02-07 PROCEDURE — G8419 CALC BMI OUT NRM PARAM NOF/U: HCPCS

## 2024-02-07 RX ORDER — ACETAMINOPHEN 500 MG
500 TABLET ORAL EVERY 6 HOURS PRN
COMMUNITY

## 2024-02-07 ASSESSMENT — PAIN DESCRIPTION - ORIENTATION: ORIENTATION: LEFT;RIGHT

## 2024-02-07 ASSESSMENT — LIFESTYLE VARIABLES
HOW MANY STANDARD DRINKS CONTAINING ALCOHOL DO YOU HAVE ON A TYPICAL DAY: PATIENT DOES NOT DRINK
HOW OFTEN DO YOU HAVE A DRINK CONTAINING ALCOHOL: NEVER

## 2024-02-07 ASSESSMENT — PAIN SCALES - GENERAL: PAINLEVEL_OUTOF10: 4

## 2024-02-07 ASSESSMENT — PAIN DESCRIPTION - LOCATION: LOCATION: KNEE

## 2024-02-07 NOTE — DISCHARGE INSTRUCTIONS
Thank you for choosing [facility] for your emergency care today. We take a lot of pride in the fact that you chose us for your care and we strived to do everything necessary to provide you with excellent medical care. We hope you agree that you received excellent care today.    To continue that excellent medical care, the following information very important that you read and understand before you leave the emergency department today. It contains information about:  Your diagnosis  What was done for you in the emergency department  What you can expect from your illness or injury moving forward  The steps you will need to take after leaving the emergency department to help achieve the best possible outcome for your illness or injury.    What we did in the Emergency Department Today:     You were seen in the Emergency Department today by Garry Meredith PA-C.     You had the following lab abnormalities:  Labs Reviewed   CBC WITH AUTO DIFFERENTIAL - Abnormal; Notable for the following components:       Result Value    Hemoglobin 11.7 (*)     Hematocrit 35.4 (*)     Lymphocytes Absolute 0.9 (*)     All other components within normal limits   BASIC METABOLIC PANEL W/ REFLEX TO MG FOR LOW K - Abnormal; Notable for the following components:    Glucose 101 (*)     All other components within normal limits   URINALYSIS WITH REFLEX TO CULTURE - Abnormal; Notable for the following components:    Blood, Urine TRACE (*)     All other components within normal limits   MICROSCOPIC URINALYSIS - Abnormal; Notable for the following components:    Mucus, UA 4+ (*)     RBC, UA 5-10 (*)     Bacteria, UA Rare (*)     All other components within normal limits   LACTATE, SEPSIS   LACTATE, SEPSIS       If no result appears for the test, then it was within normal limits. If the test is pending, the hospital will meghana you if the results are abnormal as soon as the test is completed.    You had the

## 2024-02-07 NOTE — ED TRIAGE NOTES
[facility] Emergency Department  MEDICAL SCREENING EXAM    Date of Service: 2/6/2024    Reason for Visit: Knee Pain (Bilateral - patient fell on knees a couple of months ago)        Patient History, Brief Exam, and Initial Assessment     Abbreviated HPI: Halina Rae is a 78 y.o. female presenting with ***.    INITIAL VITALS: BP: (!) 128/48, Temp: 98.8 °F (37.1 °C), Pulse: 78, Respirations: 20, SpO2: 98 %    Plan     Patient was evaluated in the REU for a medical screening exam.  To further evaluate the presenting complaints, the following orders have been placed:  Orders Placed This Encounter   Procedures    XR KNEE LEFT (1-2 VIEWS)    CBC with Auto Differential    BMP w/ Reflex to MG    Lactate, Sepsis    Urinalysis with Reflex to Culture    Microscopic Urinalysis    Straight cath        See primary provider's note for full details and final disposition.     Current Facility-Administered Medications:   Orders Placed This Encounter   Medications    acetaminophen (TYLENOL) tablet 650 mg         REU Dispo     ***Stable for lobby while awaiting ED bed  ***Based on the patient's presentation, they have been identified as needing a bed urgently. The charge nurse and ED attending  *** have been notified.       Relevant Medical History     Past Medical History:   Diagnosis Date    Cancer of breast (HCC) 01/01/2009    left    Hypertension     Stroke due to embolism of right middle cerebral artery (HCC) 12/24/2023    Systolic CHF (HCC)      Past Surgical History:   Procedure Laterality Date    APPENDECTOMY       No Known Allergies

## 2024-02-07 NOTE — PROGRESS NOTES
Medications on File Prior to Visit   Medication Sig Dispense Refill    acetaminophen (TYLENOL) 500 MG tablet Take 1 tablet by mouth every 6 hours as needed for Pain      losartan (COZAAR) 25 MG tablet Take 1 tablet by mouth daily 30 tablet 3    polyethylene glycol (GLYCOLAX) 17 g packet Take 1 packet by mouth daily as needed for Constipation 527 g 1    aspirin 81 MG chewable tablet Take 1 tablet by mouth daily 30 tablet 3    atorvastatin (LIPITOR) 80 MG tablet Take 1 tablet by mouth nightly 30 tablet 3    escitalopram (LEXAPRO) 5 MG tablet Take 1 tablet by mouth daily      carvedilol (COREG) 25 MG tablet Take 1 tablet by mouth 2 times daily (with meals)       No current facility-administered medications on file prior to visit.       Past Medical History:   Diagnosis Date    Cancer of breast (HCC) 01/01/2009    left    Hypertension     Stroke due to embolism of right middle cerebral artery (HCC) 12/24/2023    Systolic CHF (HCC)         Past Surgical History:   Procedure Laterality Date    APPENDECTOMY         No Known Allergies    Social History:  Reviewed.  reports that she has never smoked. She has been exposed to tobacco smoke. She has never used smokeless tobacco. She reports that she does not drink alcohol and does not use drugs.     Family History:  Reviewed. family history is not on file.     Review of System:    Constitutional: No fevers, chills.   Eyes: No visual changes or diplopia. No scleral icterus.  ENT: No Headaches. No mouth sores or sore throat.  Cardiovascular: No for chest pain, No for dyspnea on exertion, No for palpitations or No for loss of consciousness. No cough, hemoptysis, No for pleuritic pain, or phlebitis.  Respiratory: No for cough or wheezing. No hematemesis.    Gastrointestinal: No abdominal pain, blood in stools.   Genitourinary: No dysuria, or hematuria.  Musculoskeletal: No gait disturbance,    Integumentary: No rash or pruritis.  Neurological: No headache, change in muscle

## 2024-02-15 NOTE — ED PROVIDER NOTES
ED Attending Attestation Note     Date of evaluation: 2/6/2024    This patient was seen by the advance practice provider.  I have seen and examined the patient, agree with the workup, evaluation, management and diagnosis. The care plan has been discussed.  My assessment reveals complaints of knee pain.  Patient had a mechanical fall several days ago and said discomfort to the bilateral knees, though worse on the left, since then.  Family has also noted the patient seems more fatigued than usual.  On arrival, patient is hemodynamically stable with no focal neurologic deficits.  Will check laboratory studies, imaging.       Suman Capps MD  02/07/24 0300    
deficit, dysarthria or facial asymmetry.      Sensory: No sensory deficit.      Motor: No weakness, abnormal muscle tone or pronator drift.      Coordination: Coordination normal.      Gait: Gait abnormal (Mild antalgic gait).   Psychiatric:         Attention and Perception: Attention normal.         Mood and Affect: Mood and affect normal.         Speech: Speech normal.         Behavior: Behavior normal.       Review of Systems     Review of Systems   Constitutional:  Negative for chills, diaphoresis, fever and unexpected weight change.   HENT:  Negative for congestion, drooling, mouth sores, postnasal drip, rhinorrhea, sinus pressure and sore throat.    Eyes:  Negative for pain, redness and itching.   Respiratory:  Negative for cough, chest tightness, shortness of breath and wheezing.    Cardiovascular:  Negative for chest pain, palpitations and leg swelling.   Gastrointestinal:  Negative for abdominal distention, abdominal pain, diarrhea, nausea and vomiting.   Genitourinary:  Positive for frequency. Negative for decreased urine volume, difficulty urinating, dysuria, flank pain, hematuria and urgency.   Musculoskeletal:  Negative for arthralgias, back pain, gait problem, myalgias, neck pain and neck stiffness.        Bilateral knee pain   Skin:  Negative for color change, pallor and rash.   Neurological:  Negative for dizziness, seizures, syncope, weakness, light-headedness, numbness and headaches.   Hematological:  Does not bruise/bleed easily.   Psychiatric/Behavioral:  Negative for agitation, hallucinations, self-injury, sleep disturbance and suicidal ideas. The patient is not nervous/anxious.    All other systems reviewed and are negative.      Past Medical, Surgical, Family, and Social History     She has a past medical history of Cancer of breast (HCC), Hypertension, Stroke due to embolism of right middle cerebral artery (HCC), and Systolic CHF (HCC).  She has a past surgical history that includes

## 2024-03-10 ENCOUNTER — APPOINTMENT (OUTPATIENT)
Dept: CT IMAGING | Age: 79
End: 2024-03-10
Payer: MEDICARE

## 2024-03-10 ENCOUNTER — HOSPITAL ENCOUNTER (OUTPATIENT)
Age: 79
Setting detail: OBSERVATION
Discharge: NURSING, REHAB OR LONG-TERM CARE | End: 2024-03-13
Attending: EMERGENCY MEDICINE | Admitting: STUDENT IN AN ORGANIZED HEALTH CARE EDUCATION/TRAINING PROGRAM
Payer: MEDICARE

## 2024-03-10 ENCOUNTER — APPOINTMENT (OUTPATIENT)
Dept: GENERAL RADIOLOGY | Age: 79
End: 2024-03-10
Payer: MEDICARE

## 2024-03-10 DIAGNOSIS — I15.9 SECONDARY HYPERTENSION: ICD-10-CM

## 2024-03-10 DIAGNOSIS — R11.2 NAUSEA AND VOMITING, UNSPECIFIED VOMITING TYPE: Primary | ICD-10-CM

## 2024-03-10 LAB
ALBUMIN SERPL-MCNC: 3.9 G/DL (ref 3.4–5)
ALP SERPL-CCNC: 119 U/L (ref 40–129)
ALT SERPL-CCNC: 15 U/L (ref 10–40)
ANION GAP SERPL CALCULATED.3IONS-SCNC: 16 MMOL/L (ref 3–16)
APTT BLD: 29.7 SEC (ref 22.7–35.9)
AST SERPL-CCNC: 16 U/L (ref 15–37)
BASOPHILS # BLD: 0 K/UL (ref 0–0.2)
BASOPHILS NFR BLD: 0.7 %
BILIRUB DIRECT SERPL-MCNC: <0.2 MG/DL (ref 0–0.3)
BILIRUB INDIRECT SERPL-MCNC: NORMAL MG/DL (ref 0–1)
BILIRUB SERPL-MCNC: 0.8 MG/DL (ref 0–1)
BILIRUB UR QL STRIP.AUTO: NEGATIVE
BUN SERPL-MCNC: 13 MG/DL (ref 7–20)
CALCIUM SERPL-MCNC: 10.2 MG/DL (ref 8.3–10.6)
CHLORIDE SERPL-SCNC: 101 MMOL/L (ref 99–110)
CLARITY UR: CLEAR
CO2 SERPL-SCNC: 28 MMOL/L (ref 21–32)
COLOR UR: YELLOW
CREAT SERPL-MCNC: 0.5 MG/DL (ref 0.6–1.2)
DEPRECATED RDW RBC AUTO: 16.8 % (ref 12.4–15.4)
EOSINOPHIL # BLD: 0 K/UL (ref 0–0.6)
EOSINOPHIL NFR BLD: 0.8 %
FLUAV RNA RESP QL NAA+PROBE: NOT DETECTED
FLUBV RNA RESP QL NAA+PROBE: NOT DETECTED
GFR SERPLBLD CREATININE-BSD FMLA CKD-EPI: >60 ML/MIN/{1.73_M2}
GLUCOSE SERPL-MCNC: 121 MG/DL (ref 70–99)
GLUCOSE UR STRIP.AUTO-MCNC: NEGATIVE MG/DL
HCT VFR BLD AUTO: 37.4 % (ref 36–48)
HGB BLD-MCNC: 12.8 G/DL (ref 12–16)
HGB UR QL STRIP.AUTO: NEGATIVE
INR PPP: 1.5 (ref 0.84–1.16)
KETONES UR STRIP.AUTO-MCNC: ABNORMAL MG/DL
LEUKOCYTE ESTERASE UR QL STRIP.AUTO: NEGATIVE
LIPASE SERPL-CCNC: 21 U/L (ref 13–60)
LYMPHOCYTES # BLD: 0.8 K/UL (ref 1–5.1)
LYMPHOCYTES NFR BLD: 13.4 %
MCH RBC QN AUTO: 30.2 PG (ref 26–34)
MCHC RBC AUTO-ENTMCNC: 34.2 G/DL (ref 31–36)
MCV RBC AUTO: 88.2 FL (ref 80–100)
MONOCYTES # BLD: 0.4 K/UL (ref 0–1.3)
MONOCYTES NFR BLD: 6.4 %
NEUTROPHILS # BLD: 4.5 K/UL (ref 1.7–7.7)
NEUTROPHILS NFR BLD: 78.7 %
NITRITE UR QL STRIP.AUTO: NEGATIVE
PH UR STRIP.AUTO: 7 [PH] (ref 5–8)
PLATELET # BLD AUTO: 120 K/UL (ref 135–450)
PMV BLD AUTO: 9.5 FL (ref 5–10.5)
POTASSIUM SERPL-SCNC: 3.8 MMOL/L (ref 3.5–5.1)
PROT SERPL-MCNC: 7.8 G/DL (ref 6.4–8.2)
PROT UR STRIP.AUTO-MCNC: NEGATIVE MG/DL
PROTHROMBIN TIME: 18.1 SEC (ref 11.5–14.8)
RBC # BLD AUTO: 4.24 M/UL (ref 4–5.2)
SARS-COV-2 RNA RESP QL NAA+PROBE: NOT DETECTED
SODIUM SERPL-SCNC: 145 MMOL/L (ref 136–145)
SP GR UR STRIP.AUTO: 1.02 (ref 1–1.03)
TROPONIN, HIGH SENSITIVITY: 57 NG/L (ref 0–14)
TROPONIN, HIGH SENSITIVITY: 58 NG/L (ref 0–14)
UA COMPLETE W REFLEX CULTURE PNL UR: ABNORMAL
UA DIPSTICK W REFLEX MICRO PNL UR: ABNORMAL
URN SPEC COLLECT METH UR: ABNORMAL
UROBILINOGEN UR STRIP-ACNC: 0.2 E.U./DL
WBC # BLD AUTO: 5.8 K/UL (ref 4–11)

## 2024-03-10 PROCEDURE — 93005 ELECTROCARDIOGRAM TRACING: CPT | Performed by: STUDENT IN AN ORGANIZED HEALTH CARE EDUCATION/TRAINING PROGRAM

## 2024-03-10 PROCEDURE — 85025 COMPLETE CBC W/AUTO DIFF WBC: CPT

## 2024-03-10 PROCEDURE — 36415 COLL VENOUS BLD VENIPUNCTURE: CPT

## 2024-03-10 PROCEDURE — 96374 THER/PROPH/DIAG INJ IV PUSH: CPT

## 2024-03-10 PROCEDURE — 99285 EMERGENCY DEPT VISIT HI MDM: CPT

## 2024-03-10 PROCEDURE — 6360000002 HC RX W HCPCS: Performed by: STUDENT IN AN ORGANIZED HEALTH CARE EDUCATION/TRAINING PROGRAM

## 2024-03-10 PROCEDURE — 70498 CT ANGIOGRAPHY NECK: CPT

## 2024-03-10 PROCEDURE — 71046 X-RAY EXAM CHEST 2 VIEWS: CPT

## 2024-03-10 PROCEDURE — 80076 HEPATIC FUNCTION PANEL: CPT

## 2024-03-10 PROCEDURE — 70450 CT HEAD/BRAIN W/O DYE: CPT

## 2024-03-10 PROCEDURE — 87636 SARSCOV2 & INF A&B AMP PRB: CPT

## 2024-03-10 PROCEDURE — 81003 URINALYSIS AUTO W/O SCOPE: CPT

## 2024-03-10 PROCEDURE — 85730 THROMBOPLASTIN TIME PARTIAL: CPT

## 2024-03-10 PROCEDURE — 6360000004 HC RX CONTRAST MEDICATION: Performed by: STUDENT IN AN ORGANIZED HEALTH CARE EDUCATION/TRAINING PROGRAM

## 2024-03-10 PROCEDURE — 84484 ASSAY OF TROPONIN QUANT: CPT

## 2024-03-10 PROCEDURE — 83690 ASSAY OF LIPASE: CPT

## 2024-03-10 PROCEDURE — 80048 BASIC METABOLIC PNL TOTAL CA: CPT

## 2024-03-10 PROCEDURE — 85610 PROTHROMBIN TIME: CPT

## 2024-03-10 RX ORDER — ONDANSETRON 2 MG/ML
4 INJECTION INTRAMUSCULAR; INTRAVENOUS ONCE
Status: COMPLETED | OUTPATIENT
Start: 2024-03-10 | End: 2024-03-10

## 2024-03-10 RX ADMIN — IOPAMIDOL 75 ML: 755 INJECTION, SOLUTION INTRAVENOUS at 21:33

## 2024-03-10 RX ADMIN — ONDANSETRON 4 MG: 2 INJECTION INTRAMUSCULAR; INTRAVENOUS at 21:37

## 2024-03-10 ASSESSMENT — PAIN - FUNCTIONAL ASSESSMENT: PAIN_FUNCTIONAL_ASSESSMENT: NONE - DENIES PAIN

## 2024-03-11 ENCOUNTER — APPOINTMENT (OUTPATIENT)
Dept: MRI IMAGING | Age: 79
End: 2024-03-11
Payer: MEDICARE

## 2024-03-11 PROBLEM — R29.90 STROKE-LIKE SYMPTOMS: Status: ACTIVE | Noted: 2024-03-11

## 2024-03-11 LAB
ANION GAP SERPL CALCULATED.3IONS-SCNC: 9 MMOL/L (ref 3–16)
BUN SERPL-MCNC: 11 MG/DL (ref 7–20)
CALCIUM SERPL-MCNC: 11.3 MG/DL (ref 8.3–10.6)
CHLORIDE SERPL-SCNC: 100 MMOL/L (ref 99–110)
CO2 SERPL-SCNC: 31 MMOL/L (ref 21–32)
CREAT SERPL-MCNC: 0.6 MG/DL (ref 0.6–1.2)
DEPRECATED RDW RBC AUTO: 16.9 % (ref 12.4–15.4)
EKG ATRIAL RATE: 74 BPM
EKG DIAGNOSIS: NORMAL
EKG P AXIS: 77 DEGREES
EKG P-R INTERVAL: 168 MS
EKG Q-T INTERVAL: 390 MS
EKG QRS DURATION: 78 MS
EKG QTC CALCULATION (BAZETT): 432 MS
EKG R AXIS: 45 DEGREES
EKG T AXIS: 75 DEGREES
EKG VENTRICULAR RATE: 74 BPM
GFR SERPLBLD CREATININE-BSD FMLA CKD-EPI: >60 ML/MIN/{1.73_M2}
GLUCOSE SERPL-MCNC: 94 MG/DL (ref 70–99)
HCT VFR BLD AUTO: 39.5 % (ref 36–48)
HGB BLD-MCNC: 13.6 G/DL (ref 12–16)
MAGNESIUM SERPL-MCNC: 1.8 MG/DL (ref 1.8–2.4)
MCH RBC QN AUTO: 30.1 PG (ref 26–34)
MCHC RBC AUTO-ENTMCNC: 34.4 G/DL (ref 31–36)
MCV RBC AUTO: 87.7 FL (ref 80–100)
PLATELET # BLD AUTO: 137 K/UL (ref 135–450)
PMV BLD AUTO: 9.5 FL (ref 5–10.5)
POTASSIUM SERPL-SCNC: 3.1 MMOL/L (ref 3.5–5.1)
RBC # BLD AUTO: 4.51 M/UL (ref 4–5.2)
SODIUM SERPL-SCNC: 140 MMOL/L (ref 136–145)
WBC # BLD AUTO: 6.7 K/UL (ref 4–11)

## 2024-03-11 PROCEDURE — 97162 PT EVAL MOD COMPLEX 30 MIN: CPT

## 2024-03-11 PROCEDURE — 70551 MRI BRAIN STEM W/O DYE: CPT

## 2024-03-11 PROCEDURE — 2580000003 HC RX 258: Performed by: STUDENT IN AN ORGANIZED HEALTH CARE EDUCATION/TRAINING PROGRAM

## 2024-03-11 PROCEDURE — 6370000000 HC RX 637 (ALT 250 FOR IP): Performed by: NURSE PRACTITIONER

## 2024-03-11 PROCEDURE — G0378 HOSPITAL OBSERVATION PER HR: HCPCS

## 2024-03-11 PROCEDURE — 97116 GAIT TRAINING THERAPY: CPT

## 2024-03-11 PROCEDURE — 85027 COMPLETE CBC AUTOMATED: CPT

## 2024-03-11 PROCEDURE — 83735 ASSAY OF MAGNESIUM: CPT

## 2024-03-11 PROCEDURE — 97530 THERAPEUTIC ACTIVITIES: CPT

## 2024-03-11 PROCEDURE — 6370000000 HC RX 637 (ALT 250 FOR IP): Performed by: STUDENT IN AN ORGANIZED HEALTH CARE EDUCATION/TRAINING PROGRAM

## 2024-03-11 PROCEDURE — 80048 BASIC METABOLIC PNL TOTAL CA: CPT

## 2024-03-11 PROCEDURE — 97166 OT EVAL MOD COMPLEX 45 MIN: CPT

## 2024-03-11 PROCEDURE — 36415 COLL VENOUS BLD VENIPUNCTURE: CPT

## 2024-03-11 RX ORDER — ATORVASTATIN CALCIUM 80 MG/1
80 TABLET, FILM COATED ORAL NIGHTLY
Status: DISCONTINUED | OUTPATIENT
Start: 2024-03-11 | End: 2024-03-13 | Stop reason: HOSPADM

## 2024-03-11 RX ORDER — ASPIRIN 300 MG/1
300 SUPPOSITORY RECTAL DAILY
Status: DISCONTINUED | OUTPATIENT
Start: 2024-03-11 | End: 2024-03-11

## 2024-03-11 RX ORDER — CARVEDILOL 25 MG/1
25 TABLET ORAL 2 TIMES DAILY WITH MEALS
Status: DISCONTINUED | OUTPATIENT
Start: 2024-03-11 | End: 2024-03-13 | Stop reason: HOSPADM

## 2024-03-11 RX ORDER — ENOXAPARIN SODIUM 100 MG/ML
40 INJECTION SUBCUTANEOUS DAILY
Status: DISCONTINUED | OUTPATIENT
Start: 2024-03-11 | End: 2024-03-11

## 2024-03-11 RX ORDER — LABETALOL HYDROCHLORIDE 5 MG/ML
10 INJECTION, SOLUTION INTRAVENOUS EVERY 10 MIN PRN
Status: DISCONTINUED | OUTPATIENT
Start: 2024-03-11 | End: 2024-03-13 | Stop reason: HOSPADM

## 2024-03-11 RX ORDER — SODIUM CHLORIDE 0.9 % (FLUSH) 0.9 %
5-40 SYRINGE (ML) INJECTION EVERY 12 HOURS SCHEDULED
Status: DISCONTINUED | OUTPATIENT
Start: 2024-03-11 | End: 2024-03-13 | Stop reason: HOSPADM

## 2024-03-11 RX ORDER — POTASSIUM CHLORIDE 7.45 MG/ML
10 INJECTION INTRAVENOUS PRN
Status: DISCONTINUED | OUTPATIENT
Start: 2024-03-11 | End: 2024-03-13 | Stop reason: HOSPADM

## 2024-03-11 RX ORDER — SODIUM CHLORIDE 9 MG/ML
INJECTION, SOLUTION INTRAVENOUS PRN
Status: DISCONTINUED | OUTPATIENT
Start: 2024-03-11 | End: 2024-03-13 | Stop reason: HOSPADM

## 2024-03-11 RX ORDER — ESCITALOPRAM OXALATE 5 MG/1
5 TABLET ORAL DAILY
Status: DISCONTINUED | OUTPATIENT
Start: 2024-03-11 | End: 2024-03-13 | Stop reason: HOSPADM

## 2024-03-11 RX ORDER — LOSARTAN POTASSIUM 25 MG/1
25 TABLET ORAL DAILY
Status: DISCONTINUED | OUTPATIENT
Start: 2024-03-11 | End: 2024-03-13 | Stop reason: HOSPADM

## 2024-03-11 RX ORDER — POTASSIUM CHLORIDE 20 MEQ/1
40 TABLET, EXTENDED RELEASE ORAL PRN
Status: DISCONTINUED | OUTPATIENT
Start: 2024-03-11 | End: 2024-03-13 | Stop reason: HOSPADM

## 2024-03-11 RX ORDER — SODIUM CHLORIDE 0.9 % (FLUSH) 0.9 %
5-40 SYRINGE (ML) INJECTION PRN
Status: DISCONTINUED | OUTPATIENT
Start: 2024-03-11 | End: 2024-03-13 | Stop reason: HOSPADM

## 2024-03-11 RX ORDER — ONDANSETRON 2 MG/ML
4 INJECTION INTRAMUSCULAR; INTRAVENOUS EVERY 6 HOURS PRN
Status: DISCONTINUED | OUTPATIENT
Start: 2024-03-11 | End: 2024-03-13 | Stop reason: HOSPADM

## 2024-03-11 RX ORDER — POLYETHYLENE GLYCOL 3350 17 G/17G
17 POWDER, FOR SOLUTION ORAL DAILY PRN
Status: DISCONTINUED | OUTPATIENT
Start: 2024-03-11 | End: 2024-03-13 | Stop reason: HOSPADM

## 2024-03-11 RX ORDER — ONDANSETRON 4 MG/1
4 TABLET, ORALLY DISINTEGRATING ORAL EVERY 8 HOURS PRN
Status: DISCONTINUED | OUTPATIENT
Start: 2024-03-11 | End: 2024-03-13 | Stop reason: HOSPADM

## 2024-03-11 RX ORDER — ASPIRIN 81 MG/1
81 TABLET, CHEWABLE ORAL DAILY
Status: DISCONTINUED | OUTPATIENT
Start: 2024-03-11 | End: 2024-03-13 | Stop reason: HOSPADM

## 2024-03-11 RX ADMIN — CARVEDILOL 25 MG: 25 TABLET, FILM COATED ORAL at 18:34

## 2024-03-11 RX ADMIN — SODIUM CHLORIDE, PRESERVATIVE FREE 10 ML: 5 INJECTION INTRAVENOUS at 11:37

## 2024-03-11 RX ADMIN — ATORVASTATIN CALCIUM 80 MG: 80 TABLET, FILM COATED ORAL at 02:03

## 2024-03-11 RX ADMIN — ASPIRIN 81 MG: 81 TABLET, CHEWABLE ORAL at 10:08

## 2024-03-11 RX ADMIN — ESCITALOPRAM 5 MG: 5 TABLET, FILM COATED ORAL at 10:08

## 2024-03-11 RX ADMIN — POTASSIUM BICARBONATE 40 MEQ: 782 TABLET, EFFERVESCENT ORAL at 15:42

## 2024-03-11 RX ADMIN — APIXABAN 5 MG: 5 TABLET, FILM COATED ORAL at 21:17

## 2024-03-11 RX ADMIN — LOSARTAN POTASSIUM 25 MG: 25 TABLET, FILM COATED ORAL at 10:08

## 2024-03-11 RX ADMIN — APIXABAN 5 MG: 5 TABLET, FILM COATED ORAL at 10:55

## 2024-03-11 RX ADMIN — ATORVASTATIN CALCIUM 80 MG: 80 TABLET, FILM COATED ORAL at 21:17

## 2024-03-11 NOTE — ED PROVIDER NOTES
THE [facility]  EMERGENCY DEPARTMENT ENCOUNTER          EM RESIDENT NOTE       Date of evaluation: 3/10/2024    Chief Complaint     Emesis (Pt presents to the ED due to vomiting since yesterday. Pt has been unable to take meds due to vomiting. Pt given 4 mg of zofran en route.) and Hypertension    History of Present Illness     Halina Rae is a 78 y.o. female who presents for evaluation of vomiting, hypertension yesterday morning, she took her morning meds with breakfast and felt fine, however in the later afternoon, started to feel nauseous, took her evening medicines, however believes that she might of thrown those up.  She felt fine otherwise with no abdominal pain, headache, vision changes or dizziness.  She does however note that her speech sounds a little bit more slurred, but she also does not have her dentures in.  She does not feel as if she is about to vomit now, just mildly nauseous.  She has had no recent fevers, abdominal pain, diarrhea constipation, dysuria hematuria, urinary urgency or frequency.  She denies any known ill contacts.  She has had a mildly productive cough recently, but not felt short of breath and has no chest pain.  She has no new numbness tingling or weakness that she notes.  No speech changes she believes started yesterday afternoon.    Review of Systems     A complete review of systems was performed and is otherwise negative.     Past Medical, Surgical, Family, and Social History     She has a past medical history of Cancer of breast (HCC), Hypertension, Stroke due to embolism of right middle cerebral artery (HCC), and Systolic CHF (HCC).  She has a past surgical history that includes Appendectomy.  Her family history is not on file.  She reports that she has never smoked. She has been exposed to tobacco smoke. She has never used smokeless tobacco. She reports that she does not drink alcohol and does not use drugs.    Medications     Current Discharge Medication List

## 2024-03-11 NOTE — ED PROVIDER NOTES
ED Attending Attestation Note     Date of evaluation: 3/10/2024    This patient was seen by the resident.  I have seen and examined the patient, agree with the workup, evaluation, management and diagnosis. The care plan has been discussed.  I have reviewed the ECG and concur with the resident's interpretation.  My assessment reveals an elderly female who presents with elevated blood pressures.  Upon arrival, patient is alert and oriented.  She is noted to have a left-sided facial droop and some mild dysarthria although she is understandable.  No pronator drift and no weakness noted to any extremities.     Aaliyah Rosario MD  03/10/24 2707

## 2024-03-11 NOTE — ED NOTES
Patient placed on Lina Dye RN  03/10/24 5398    
Pt bladder scanned by RN and >324 mL was noted. MD notified and straight catheter ordered.     Aaliyah Goetz, YANNICK  03/11/24 0013    
components:       Result Value    RDW 16.8 (*)     Platelets 120 (*)     Lymphocytes Absolute 0.8 (*)     All other components within normal limits   BASIC METABOLIC PANEL W/ REFLEX TO MG FOR LOW K - Abnormal; Notable for the following components:    Glucose 121 (*)     Creatinine 0.5 (*)     All other components within normal limits   URINALYSIS WITH REFLEX TO CULTURE - Abnormal; Notable for the following components:    Ketones, Urine TRACE (*)     All other components within normal limits   PROTIME-INR - Abnormal; Notable for the following components:    Protime 18.1 (*)     INR 1.50 (*)     All other components within normal limits   TROPONIN - Abnormal; Notable for the following components:    Troponin, High Sensitivity 58 (*)     All other components within normal limits   TROPONIN - Abnormal; Notable for the following components:    Troponin, High Sensitivity 57 (*)     All other components within normal limits     Critical values: no     Abnormal Assessment Findings:     Background  History:   Past Medical History:   Diagnosis Date    Cancer of breast (HCC) 01/01/2009    left    Hypertension     Stroke due to embolism of right middle cerebral artery (HCC) 12/24/2023    Systolic CHF (HCC)        Assessment    Vitals/MEWS:    Level of Consciousness: Alert (0)   Vitals:    03/10/24 2115 03/10/24 2148 03/10/24 2230 03/11/24 0000   BP: (!) 200/112  (!) 176/81 (!) 184/81   Pulse: 94  64 62   Resp: 18  13 15   Temp:  97.7 °F (36.5 °C)     TempSrc:  Oral     SpO2: 96%  94% 99%   Weight: 57.5 kg (126 lb 12.2 oz)      Height: 1.651 m (5' 5\")        FiO2 (%):   O2 Flow Rate: O2 Device: None (Room air)    Cardiac Rhythm:    Pain Assessment:  [x] Verbal [] Barrow Schmitt Scale  Pain Scale: Pain Assessment  Pain Assessment: None - Denies Pain  Last documented pain score (0-10 scale)    Last documented pain medication administered: NA  Mental Status: disoriented, alert, logical, and thought processes intact  Orientation Level:

## 2024-03-11 NOTE — H&P
Dyspnea, productive cough COMPARISON: December 24, 2023 FINDINGS: PA and lateral views of the chest demonstrate a stable cardiomediastinal silhouette. Lungs are clear. Normal pulmonary vascularity. No pleural effusion or acute bony abnormality.     No acute chest process. Electronically signed by Baudilio Shi MD        Electronically signed by Naga Kaur MD on 3/11/2024 at 4:19 AM

## 2024-03-11 NOTE — PLAN OF CARE
Problem: Safety - Adult  Goal: Free from fall injury  Outcome: Progressing  Note: All fall precautions in place. Call light within reach.

## 2024-03-11 NOTE — PLAN OF CARE
Problem: Discharge Planning  Goal: Discharge to home or other facility with appropriate resources  Outcome: Progressing   Pt will be assessed by PT/OT  Problem: Skin/Tissue Integrity  Goal: Absence of new skin breakdown  Description: 1.  Monitor for areas of redness and/or skin breakdown  2.  Assess vascular access sites hourly  3.  Every 4-6 hours minimum:  Change oxygen saturation probe site  4.  Every 4-6 hours:  If on nasal continuous positive airway pressure, respiratory therapy assess nares and determine need for appliance change or resting period.  Outcome: Progressing   Skin integrity will remain intact  Problem: Safety - Adult  Goal: Free from fall injury  3/11/2024 0750 by Elias Gage RN  Outcome: Progressing   All fall prevention measures in place  Problem: ABCDS Injury Assessment  Goal: Absence of physical injury  Outcome: Progressing   Pt will be free from falls and injury

## 2024-03-11 NOTE — CONSULTS
Clinical Pharmacy Consult Note  Medication History     Admit Date: 3/10/2024    Pharmacy consulted to verify home medication list by SHELDON Pena.    Contacted pt's grandsonBoogie who stated he does not speak English. In the meantime, was able to get a hold of home health RN, Tania @ [phone] who was able to provide me a list of patient's med list. She did state that around Feb 23, she reached out to PCP as pt's family stated they could not afford Eliquis. However, when she was back at the house on Feb 26th, pt's family was able to fill Eliquis and had a supply at home.    List of of current medications patient is taking is complete. Home Medication list in EPIC updated to reflect changes noted below.    Source of information: Rx dispense history, Home Health RN    Patient's home pharmacy: [facility]     Current Outpatient Medications   Medication Instructions    acetaminophen (TYLENOL) 500 mg, Oral, EVERY 6 HOURS PRN    apixaban (ELIQUIS) 5 mg, Oral, 2 TIMES DAILY    aspirin 81 mg, Oral, DAILY    atorvastatin (LIPITOR) 80 mg, Oral, NIGHTLY    carvedilol (COREG) 25 mg, Oral, 2 TIMES DAILY WITH MEALS    escitalopram (LEXAPRO) 5 mg, Oral, DAILY    losartan (COZAAR) 25 mg, Oral, DAILY     Please call with questions--  Jenifer Monroe, PharmD, [facility]  Wireless: [phone]   3/11/2024 11:12 AM

## 2024-03-12 PROCEDURE — 97535 SELF CARE MNGMENT TRAINING: CPT

## 2024-03-12 PROCEDURE — G0378 HOSPITAL OBSERVATION PER HR: HCPCS

## 2024-03-12 PROCEDURE — 6370000000 HC RX 637 (ALT 250 FOR IP): Performed by: STUDENT IN AN ORGANIZED HEALTH CARE EDUCATION/TRAINING PROGRAM

## 2024-03-12 PROCEDURE — 97530 THERAPEUTIC ACTIVITIES: CPT

## 2024-03-12 RX ADMIN — APIXABAN 5 MG: 5 TABLET, FILM COATED ORAL at 08:38

## 2024-03-12 RX ADMIN — ASPIRIN 81 MG: 81 TABLET, CHEWABLE ORAL at 08:38

## 2024-03-12 RX ADMIN — CARVEDILOL 25 MG: 25 TABLET, FILM COATED ORAL at 08:38

## 2024-03-12 RX ADMIN — CARVEDILOL 25 MG: 25 TABLET, FILM COATED ORAL at 20:18

## 2024-03-12 RX ADMIN — ATORVASTATIN CALCIUM 80 MG: 80 TABLET, FILM COATED ORAL at 20:18

## 2024-03-12 RX ADMIN — LOSARTAN POTASSIUM 25 MG: 25 TABLET, FILM COATED ORAL at 08:38

## 2024-03-12 RX ADMIN — APIXABAN 5 MG: 5 TABLET, FILM COATED ORAL at 20:19

## 2024-03-12 RX ADMIN — ESCITALOPRAM 5 MG: 5 TABLET, FILM COATED ORAL at 08:38

## 2024-03-12 NOTE — PLAN OF CARE
Problem: Discharge Planning  Goal: Discharge to home or other facility with appropriate resources  Outcome: Progressing  Flowsheets (Taken 3/12/2024 1613)  Discharge to home or other facility with appropriate resources:   Identify barriers to discharge with patient and caregiver   Arrange for needed discharge resources and transportation as appropriate   Identify discharge learning needs (meds, wound care, etc)  Note: Patient will have all needs met prior to discharge      Problem: Safety - Adult  Goal: Free from fall injury  Outcome: Progressing  Note: Patient will remain fall free during stay by implementing and following all safety precautions.

## 2024-03-12 NOTE — FLOWSHEET NOTE
Pt back to bed with cga and walker. Brief changed and hodan care completed. Bed alarm armed. POC discussed with pt for the night and all questions answered.

## 2024-03-12 NOTE — PLAN OF CARE
Problem: Discharge Planning  Goal: Discharge to home or other facility with appropriate resources  Outcome: Progressing  Flowsheets (Taken 3/11/2024 2234)  Discharge to home or other facility with appropriate resources: Identify discharge learning needs (meds, wound care, etc)     Problem: Skin/Tissue Integrity  Goal: Absence of new skin breakdown  Description: 1.  Monitor for areas of redness and/or skin breakdown  2.  Assess vascular access sites hourly  3.  Every 4-6 hours minimum:  Change oxygen saturation probe site  4.  Every 4-6 hours:  If on nasal continuous positive airway pressure, respiratory therapy assess nares and determine need for appliance change or resting period.  Outcome: Progressing     Problem: Safety - Adult  Goal: Free from fall injury  Outcome: Progressing  Problem: ABCDS Injury Assessment  Goal: Absence of physical injury  Outcome: Progressing  Flowsheets  Taken 3/11/2024 2234 by Andrés Toure RN  Absence of Physical Injury: Implement safety measures based on patient assessment  Problem: Neurosensory - Adult  Goal: Achieves stable or improved neurological status  Outcome: Progressing  Flowsheets (Taken 3/11/2024 2234)  Achieves stable or improved neurological status: Assess for and report changes in neurological status     Problem: Respiratory - Adult  Goal: Achieves optimal ventilation and oxygenation  Outcome: Progressing  Flowsheets (Taken 3/11/2024 2234)  Achieves optimal ventilation and oxygenation: Assess for changes in respiratory status     Problem: Musculoskeletal - Adult  Goal: Return mobility to safest level of function  Outcome: Progressing  Flowsheets (Taken 3/11/2024 2234)  Return Mobility to Safest Level of Function: Assess patient stability and activity tolerance for standing, transferring and ambulating with or without assistive devices

## 2024-03-13 VITALS
HEIGHT: 65 IN | DIASTOLIC BLOOD PRESSURE: 74 MMHG | RESPIRATION RATE: 16 BRPM | SYSTOLIC BLOOD PRESSURE: 128 MMHG | OXYGEN SATURATION: 97 % | WEIGHT: 126.76 LBS | HEART RATE: 62 BPM | TEMPERATURE: 98 F | BODY MASS INDEX: 21.12 KG/M2

## 2024-03-13 PROCEDURE — 97530 THERAPEUTIC ACTIVITIES: CPT

## 2024-03-13 PROCEDURE — G0378 HOSPITAL OBSERVATION PER HR: HCPCS

## 2024-03-13 PROCEDURE — 97535 SELF CARE MNGMENT TRAINING: CPT

## 2024-03-13 PROCEDURE — 97116 GAIT TRAINING THERAPY: CPT

## 2024-03-13 PROCEDURE — 6370000000 HC RX 637 (ALT 250 FOR IP): Performed by: STUDENT IN AN ORGANIZED HEALTH CARE EDUCATION/TRAINING PROGRAM

## 2024-03-13 RX ORDER — POLYETHYLENE GLYCOL 3350 17 G/17G
17 POWDER, FOR SOLUTION ORAL DAILY PRN
Qty: 30 PACKET | Refills: 0 | DISCHARGE
Start: 2024-03-13 | End: 2024-04-12

## 2024-03-13 RX ADMIN — ASPIRIN 81 MG: 81 TABLET, CHEWABLE ORAL at 08:52

## 2024-03-13 RX ADMIN — LOSARTAN POTASSIUM 25 MG: 25 TABLET, FILM COATED ORAL at 08:51

## 2024-03-13 RX ADMIN — APIXABAN 5 MG: 5 TABLET, FILM COATED ORAL at 08:49

## 2024-03-13 RX ADMIN — ESCITALOPRAM 5 MG: 5 TABLET, FILM COATED ORAL at 08:49

## 2024-03-13 RX ADMIN — CARVEDILOL 25 MG: 25 TABLET, FILM COATED ORAL at 08:49

## 2024-03-13 NOTE — PLAN OF CARE
Problem: Discharge Planning  Goal: Discharge to home or other facility with appropriate resources  3/13/2024 1719 by Linda Bishop RN  Outcome: Adequate for Discharge  3/13/2024 1352 by Linda Bishop RN  Outcome: Progressing     Problem: Skin/Tissue Integrity  Goal: Absence of new skin breakdown  Description: 1.  Monitor for areas of redness and/or skin breakdown  2.  Assess vascular access sites hourly  3.  Every 4-6 hours minimum:  Change oxygen saturation probe site  4.  Every 4-6 hours:  If on nasal continuous positive airway pressure, respiratory therapy assess nares and determine need for appliance change or resting period.  3/13/2024 1719 by Linda Bishop RN  Outcome: Adequate for Discharge  3/13/2024 1352 by Linda Bishop RN  Outcome: Progressing     Problem: Safety - Adult  Goal: Free from fall injury  3/13/2024 1719 by Linda Bishop RN  Outcome: Adequate for Discharge  3/13/2024 1352 by Linda Bishop RN  Outcome: Progressing     Problem: ABCDS Injury Assessment  Goal: Absence of physical injury  3/13/2024 1719 by Linda Bishop RN  Outcome: Adequate for Discharge  3/13/2024 1352 by Linda Bishop RN  Outcome: Progressing     Problem: Neurosensory - Adult  Goal: Achieves stable or improved neurological status  3/13/2024 1719 by Linda Bishop RN  Outcome: Adequate for Discharge  3/13/2024 1352 by Linda Bishop RN  Outcome: Progressing  Flowsheets (Taken 3/13/2024 1352)  Achieves stable or improved neurological status:   Assess for and report changes in neurological status   Maintain blood pressure and fluid volume within ordered parameters to optimize cerebral perfusion and minimize risk of hemorrhage   Monitor temperature, glucose, and sodium. Initiate appropriate interventions as ordered

## 2024-03-13 NOTE — CARE COORDINATION
CM following: CM spoke with pt's daughter, Bouchra, rosanna AM and Antonina reports that [facility] family's [facility] choice in SNF.     CM spoke with Shital at [facility] who reports they are not able to accept the pt due to her periods of confusion. [facility] and [facility] is able to accept the pt.     CM spoke with pt at bedside and informed that [facility] could not not accept and [facility] could. Pt expressed being in agreement with [facility], due to pt's periods of confusion CM reached out to pt's daughter and grandson by phone to verify agreement with [facility] for discharge plan. CM had to leave a VM for both family members. Once facility selected CM will submit precert in [facility]. CM will continue to follow for discharge planning.  Electronically signed by JAC Simental on 3/12/2024 at 4:57 PM  [phone]  
Skilled Nursing Facility            Potential DME:    Patient expects to discharge to: Skilled nursing facility  Plan for transportation at discharge: Other (see comment) (medical transport)    Financial    Payor: [employer] MEDICARE / Plan: [employer] GOLD PLUS HMO / Product Type: *No Product type* /     Does insurance require precert for SNF: Yes    Potential assistance Purchasing Medications: No  Meds-to-Beds request: Yes      [facility] - [place], [place] - [address] - P [phone] - F [phone]  [address]  [place] [address]  Phone: [phone] Fax: [phone]      Notes:    Factors facilitating achievement of predicted outcomes: Family support, Motivated, Cooperative, and Pleasant    Barriers to discharge: accepting SNF, will need precert    Additional Case Management Notes: CM met with pt at bedside. Pt is from home with her daughter, Bouchra, and grandson, Boogie. CM spoke with pt and pt's daughter on the phone. Both are in agreement with referrals to [facility] and [facility] (referrals have been sent). Pt does NOT want referrals to [facility], [facility] or [facility]. CM will continue to follow for discharge planning.    The Plan for Transition of Care is related to the following treatment goals of Stroke-like symptoms [R29.90]    IF APPLICABLE: The Patient and/or patient representative Halina and her family were provided with a choice of provider and agrees with the discharge plan. Freedom of choice list with basic dialogue that supports the patient's individualized plan of care/goals and shares the quality data associated with the providers was provided to: Patient, Patient Representative   Patient Representative Name: daughter Travis Shook     The Patient and/or Patient Representative Agree with the Discharge Plan? Yes    JAC Simental  Case Management Department  Ph: [phone] Fax: [phone]    
[phone]

## 2024-03-13 NOTE — DISCHARGE INSTR - COC
Schedule:  Phone:  Fax:    / signature: Electronically signed by JAC Simental on 3/13/24 at 11:33 AM EDT    PHYSICIAN SECTION    Prognosis: Good    Condition at Discharge: Stable    Rehab Potential (if transferring to Rehab): Good    Recommended Labs or Other Treatments After Discharge:     Physician Certification: I certify the above information and transfer of Halina Rae  is necessary for the continuing treatment of the diagnosis listed and that she requires Skilled Nursing Facility for greater 30 days.     Update Admission H&P: No change in H&P    PHYSICIAN SIGNATURE:  Electronically signed by SHELDON Romero CNP on 3/13/24 at 11:55 AM EDT

## 2024-03-13 NOTE — PROGRESS NOTES
4 Eyes Skin Assessment     NAME:  Halina Rae  YOB: 1945  MEDICAL RECORD NUMBER:  [id number]    The patient is being assessed for  Admission    I agree that at least one RN has performed a thorough Head to Toe Skin Assessment on the patient. ALL assessment sites listed below have been assessed.      Areas assessed by both nurses:    Head, Face, Ears, Shoulders, Back, Chest, Arms, Elbows, Hands, Sacrum. Buttock, Coccyx, Ischium, Legs. Feet and Heels, and Under Medical Devices     -Blanchable redness to bottom  -Abrasions to LLE        Does the Patient have a Wound? No noted wound(s)       Thierry Prevention initiated by RN: Yes  Wound Care Orders initiated by RN: No    Pressure Injury (Stage 3,4, Unstageable, DTI, NWPT, and Complex wounds) if present, place Wound referral order by RN under : No    New Ostomies, if present place, Ostomy referral order under : No     Nurse 1 eSignature: Electronically signed by Sienna Dee RN on 3/11/24 at 4:02 AM EDT    **SHARE this note so that the co-signing nurse can place an eSignature**    Nurse 2 eSignature: Electronically signed by Kala Cool RN on 3/11/24 at 4:38 AM EDT  
Brief Progress Note    The patient Halina Rae is acutely, critically ill and requires cross-sectional imaging with intravenous contrast. The potential risk of clinically significant kidney injury as a consequence of contrast, rather than their underlying etiology, is outweighed by the very real potential benefit of discovering an actionable abnormality. According to recent literature co-authored by the American College of Radiology and the National Kid[employer] Foundation (PMID: 97962029), the risk of SEAMUS in patients with baseline eGFR of <30mL/min/1.73m2 is 0-17%, and less than 2% in patients with higher eGFR. My clinical judgment suggests that Halina Rae is at higher risk of harm from delaying her diagnosis and subsequent therapy.     Asmita Kidd MD  3/10/2024  
Occupational Therapy  Facility/Department: [facility] ORTHO/NEURO  Occupational Therapy Initial Assessment/Treatment     Name: Halina Rae  : 1945  MRN: [id number]  Date of Service: 3/11/2024    Discharge Recommendations:  Subacute/Skilled Nursing Facility  OT Equipment Recommendations  Equipment Needed: No       Patient Diagnosis(es): The primary encounter diagnosis was Nausea and vomiting, unspecified vomiting type. A diagnosis of Secondary hypertension was also pertinent to this visit.  Past Medical History:  has a past medical history of Cancer of breast (HCC), Hypertension, Stroke due to embolism of right middle cerebral artery (HCC), and Systolic CHF (HCC).  Past Surgical History:  has a past surgical history that includes Appendectomy.    Treatment Diagnosis: Decreased functional mobility/transfers, cognition, and independence with ADLs/IADLs.      Assessment   Performance deficits / Impairments: Decreased endurance;Decreased coordination;Decreased functional mobility ;Decreased posture;Decreased ADL status;Decreased balance;Decreased strength;Decreased vision/visual deficit;Decreased high-level IADLs;Decreased fine motor control;Decreased cognition;Decreased safe awareness  Assessment: Pt provides home information/PLOF however questionable historian. Per pt report, pt ind with all ADLs except bathing family provides SBA for general safety. Pt requires SBA for overall transfers/mobility using RW at baseline. Throughout session, pt requires min assist x 1 overall for functional mobility/transfers and SBA/CGA for functional ADL participation secondary to decreased command following, coordination, balance, and cognition. Pt demos several LOB throughout functional home gait assessment/treatment requiring min assist for safe recovery. Pt safety limited significantly from decreased cog as pt demos impulsivity and distraction throughout session. Pt would continue to benefit from skilled occupational therapy 
Patient is A/O x4, disoriented at times. VSS on RA with exception to BP. Pt voiding adequately via incontinence brief and purewick. Pt tolerating PO fluid intake well. Pt denies any pain at this time. Call light within reach. Fall precautions in place. Plan of care continues.   
Physical Therapy  Facility/Department: [facility] 5T ORTHO/NEURO  Daily Treatment Note  NAME: Halina Rae  : 1945  MRN: [id number]    Date of Service: 3/12/2024    Discharge Recommendations:  Subacute/Skilled Nursing Facility   PT Equipment Recommendations  Equipment Needed:  (defer)    Patient Diagnosis(es): The primary encounter diagnosis was Nausea and vomiting, unspecified vomiting type. A diagnosis of Secondary hypertension was also pertinent to this visit.    Assessment   Assessment: Pt requiring SBA for bed mobility this session and min A for transfers and ambulation w/ RW; impulsive with some mobility. Pt lives w/ grandson who is WC bound and unable to assist with mobility. Pt functioning below baseline and would benefit from SNF upon d/c for safety and  to regain functional independence.  Activity Tolerance: Patient tolerated evaluation without incident;Treatment limited secondary to decreased cognition  Equipment Needed:  (defer)     Plan    Physical Therapy Plan  General Plan:  (2-5)  Current Treatment Recommendations: Strengthening;Balance training;Gait training;Functional mobility training;Transfer training;Safety education & training;Cognitive reorientation     Restrictions  Position Activity Restriction  Other position/activity restrictions: up as tolerated     Subjective    Subjective  Subjective: Pt in bed and agreeable to therapy  Pain: Pt states pain in L knee: ongoing; RN aware  Orientation  Overall Orientation Status: Impaired  Orientation Level: Oriented to person;Oriented to place;Disoriented to time  Cognition  Overall Cognitive Status: Exceptions     Objective   Vitals     Bed Mobility Training  Bed Mobility Training: Yes  Overall Level of Assistance: Stand-by assistance  Supine to Sit: Stand-by assistance (HOB elevated)  Scooting: Stand-by assistance  Balance  Sitting: High guard (CGA/SBA for static/dynamic balance)  Standing: With support (CGA/Min A for standing balance at 
Pt is Aox2. Current NIH is a 4. All fall prevention measures in place, call light within reach and chair alarm on. Pt has had adequate PO intake and UOP this shift. Awaiting precert for SNF.  
Pt is Aox2. Current NIH is a 5. All fall prevention measures in place, call light within reach and chair alarm on. Pt has had adequate PO intake and UOP this shift.   
This patient has officially been discharged she is medically clear though is awaiting pre-CERT and placement.  I went to check on her she is denying any new complaints she has some mild pain on her left knee for which she is using her compressions.  Vitals remained stable staff reports no new complaints.  Will continue to monitor    SHELDON Romero - CNP  3/12/2024  1:30 PM    
A  UE Dressing: Minimal assistance;Increased time to complete;Verbal cueing  UE Dressing Skilled Clinical Factors: donning gown  LE Dressing: Increased time to complete;Verbal cueing;Moderate assistance  LE Dressing Skilled Clinical Factors: Pt donning briefs, pants and socks with ataxic uncontroled movement limiting pt  Toileting: Moderate assistance  Toileting Skilled Clinical Factors: Pt requiring clean up from urine soaked brief with Mod A pt completing hodan care in stance  Skin Care: Bath wipes        Safety Devices  Type of Devices: Left in chair;Call light within reach;Chair alarm in place;Bed alarm in place;Gait belt;Nurse notified     Patient Education  Education Provided: Role of Therapy;IADL Safety;Orientation;Plan of Care;Precautions;ADL Adaptive Strategies;Fall Prevention Strategies;Transfer Training  Education Method: Demonstration;Verbal  Barriers to Learning: Cognition;Vision  Education Outcome: Verbalized understanding;Demonstrated understanding;Continued education needed    Goals  Short Term Goals  Time Frame for Short Term Goals: By D/C  Short Term Goal 1: Pt will demonstrate transfer to/from all functional surfaces with SBA and RW.  Short Term Goal 2: Pt will demonstrate LBD with SBA.  Short Term Goal 3: Pt will find 3/3 destinations in way finding task.  Short Term Goal 4: Pt will tolerate standing for 10 minutes to complete ADLs.    AM-PAC - ADL       Therapy Time   Individual Concurrent Group Co-treatment   Time In 1127         Time Out 1200         Minutes 33         Timed Code Treatment Minutes: 33 Minutes       ODALIS Beltrán/JESSE     
continues to demonstrate decreased coordination/command following and increased impulsivity with RW and gait.     ADL  Grooming: Minimal assistance;Verbal cueing;Increased time to complete;Adaptive equipment  Grooming Skilled Clinical Factors: Pt completes grooming in stance at sink with min assist x 1.  UE Dressing: Minimal assistance;Increased time to complete;Verbal cueing  LE Dressing: Increased time to complete;Verbal cueing;Moderate assistance  LE Dressing Skilled Clinical Factors: [place]/donning brief and donning pants  Toileting: Minimal assistance;Increased time to complete;Setup  Toileting Skilled Clinical Factors: Min assist standing balance for hodan care  Skin Care: Bath wipes        Safety Devices  Type of Devices: Nurse notified;Chair alarm in place;Left in chair;Call light within reach;Gait belt     Patient Education  Education Given To: Patient  Education Provided: Role of Therapy;IADL Safety;Orientation;Plan of Care;Precautions;ADL Adaptive Strategies;Fall Prevention Strategies;Transfer Training  Education Method: Demonstration;Verbal  Barriers to Learning: Cognition;Vision  Education Outcome: Verbalized understanding;Demonstrated understanding;Continued education needed    Goals  Short Term Goals  Time Frame for Short Term Goals: By D/C  Short Term Goal 1: Pt will demonstrate transfer to/from all functional surfaces with SBA and RW.  Short Term Goal 2: Pt will demonstrate LBD with SBA.  Short Term Goal 3: Pt will find 3/3 destinations in way finding task.  Short Term Goal 4: Pt will tolerate standing for 10 minutes to complete ADLs.    AM-PAC - ADL  AM-PAC Daily Activity - Inpatient   How much help is needed for putting on and taking off regular lower body clothing?: A Lot  How much help is needed for bathing (which includes washing, rinsing, drying)?: A Lot  How much help is needed for toileting (which includes using toilet, bedpan, or urinal)?: A Lot  How much help is needed for putting on and 
needed    AM-PAC - Mobility    AM-PAC Basic Mobility - Inpatient   How much help is needed turning from your back to your side while in a flat bed without using bedrails?: A Little  How much help is needed moving from lying on your back to sitting on the side of a flat bed without using bedrails?: A Little  How much help is needed moving to and from a bed to a chair?: A Little  How much help is needed standing up from a chair using your arms?: A Little  How much help is needed walking in hospital room?: A Little  How much help is needed climbing 3-5 steps with a railing?: Total  AM-PAC Inpatient Mobility Raw Score : 16  AM-PAC Inpatient T-Scale Score : 40.78  Mobility Inpatient CMS 0-100% Score: 54.16  Mobility Inpatient CMS G-Code Modifier : CK         Therapy Time   Individual Concurrent Group Co-treatment   Time In 1200         Time Out 1230         Minutes 30                 Antonietta Sandhu, PT           
controlling RW     Balance  Comments: Min A for stability with transfers and gait 2/2 ataxic movements of LLE and trunk.         AM-PAC - Mobility    AM-PAC Basic Mobility - Inpatient   How much help is needed turning from your back to your side while in a flat bed without using bedrails?: A Little  How much help is needed moving from lying on your back to sitting on the side of a flat bed without using bedrails?: A Lot  How much help is needed moving to and from a bed to a chair?: A Little  How much help is needed standing up from a chair using your arms?: A Little  How much help is needed walking in hospital room?: A Little  How much help is needed climbing 3-5 steps with a railing?: Total  AM-PAC Inpatient Mobility Raw Score : 15  AM-PAC Inpatient T-Scale Score : 39.45  Mobility Inpatient CMS 0-100% Score: 57.7  Mobility Inpatient CMS G-Code Modifier : CK    Goals  Short Term Goals  Time Frame for Short Term Goals: Discharge  Short Term Goal 1: Bed mobility with supervision  Short Term Goal 2: Transfers with CG  Short Term Goal 3: Ambulate 60 ft with RW and CG with good stability  Patient Goals   Patient Goals : to return home       Education  Patient Education  Education Given To: Patient  Education Provided: Role of Therapy;Plan of Care;Transfer Training  Education Provided Comments: safety with gait  Education Method: Demonstration;Verbal  Barriers to Learning: Cognition  Education Outcome: Continued education needed      Therapy Time   Individual Concurrent Group Co-treatment   Time In 0840         Time Out 0921         Minutes 41             Timed Code Treatment Minutes:   25    Total Treatment Minutes:  41      Gill Huang, [id number]

## 2024-03-13 NOTE — PLAN OF CARE
Problem: Discharge Planning  Goal: Discharge to home or other facility with appropriate resources  Outcome: Progressing     Problem: Skin/Tissue Integrity  Goal: Absence of new skin breakdown  Description: 1.  Monitor for areas of redness and/or skin breakdown  2.  Assess vascular access sites hourly  3.  Every 4-6 hours minimum:  Change oxygen saturation probe site  4.  Every 4-6 hours:  If on nasal continuous positive airway pressure, respiratory therapy assess nares and determine need for appliance change or resting period.  Outcome: Progressing     Problem: Safety - Adult  Goal: Free from fall injury  Outcome: Progressing     Problem: ABCDS Injury Assessment  Goal: Absence of physical injury  Outcome: Progressing     Problem: Neurosensory - Adult  Goal: Achieves stable or improved neurological status  Outcome: Progressing  Flowsheets (Taken 3/13/2024 0548)  Achieves stable or improved neurological status:   Assess for and report changes in neurological status   Maintain blood pressure and fluid volume within ordered parameters to optimize cerebral perfusion and minimize risk of hemorrhage   Monitor temperature, glucose, and sodium. Initiate appropriate interventions as ordered     Problem: Respiratory - Adult  Goal: Achieves optimal ventilation and oxygenation  Outcome: Progressing     Problem: Musculoskeletal - Adult  Goal: Return mobility to safest level of function  Outcome: Progressing

## 2024-03-13 NOTE — DISCHARGE SUMMARY
V2.0  Discharge Summary    Name:  Halina Rae /Age/Sex: 1945 (78 y.o. female)   Admit Date: 3/10/2024  Discharge Date: 3/11/24    MRN & CSN:  [id number] & [id number] Encounter Date and Time 3/11/24 3:15 PM EDT    Attending:  Colin Kingston MD Discharging Provider: SHELDON Romero CNP       Hospital Course:   The patient was medically cleared for DC on 3/11. It was recommended she be discharged to SNF. Her delay in leaving the hospital is due to placement and insurance pre certification.       Brief HPI: Halina Rae is a 78 y.o. female with a past medical history of hypertension, hyperlipidemia, depression, recent MCA CVA, chronic systolic heart failure with improved ejection fraction, major depressive disorder who presented to the emergency room with reported slurred speech    Patient stated that she had nausea with reported emesis prior to the reported slurred speech. In the emergency room the patient had CTA of the brain and CTA of the head and neck with no acute findings.  The patient was found to have a blood pressure in the 220 systolic which decreased without intervention into the 160s    Patient was admitted and MRI of the brain was completed.  This demonstrated a large volume of encephalomalacia and cortical laminar necrosis with hemosiderin staining along the gyri involving the posterior right MCA distribution infarct. The findings appear to represent expected evolution of the infarct identified on the previous MRI from 2023. No new infarct or acute complication clearly identified.  NIH scale 1    The patient worked with physical therapy and Occupational Therapy who recommended skilled nursing facility.  Patient was discharged in stable condition    Brief Problem Based Course:   Strokelike symptoms-worsening slurred speech.  Likely recrudescence of prior CVA.  This could have been triggered by her gastroenteritis which preceded the return of the symptoms.  Nausea and 
0.8   ALKPHOS 119     Lipids:   Lab Results   Component Value Date/Time    CHOL 169 12/25/2023 07:33 AM    HDL 47 12/25/2023 07:33 AM    TRIG 59 12/25/2023 07:33 AM     Hemoglobin A1C:   Lab Results   Component Value Date/Time    LABA1C 5.2 12/25/2023 07:33 AM     TSH: No results found for: \"TSH\"  Troponin: No results found for: \"TROPONINT\"  Lactic Acid: No results for input(s): \"LACTA\" in the last 72 hours.  BNP: No results for input(s): \"PROBNP\" in the last 72 hours.  UA:  Lab Results   Component Value Date/Time    NITRU Negative 03/10/2024 11:38 PM    COLORU Yellow 03/10/2024 11:38 PM    PHUR 7.0 03/10/2024 11:38 PM    WBCUA 3-5 02/07/2024 01:20 AM    RBCUA 5-10 02/07/2024 01:20 AM    MUCUS 4+ 02/07/2024 01:20 AM    BACTERIA Rare 02/07/2024 01:20 AM    CLARITYU Clear 03/10/2024 11:38 PM    SPECGRAV 1.020 03/10/2024 11:38 PM    LEUKOCYTESUR Negative 03/10/2024 11:38 PM    UROBILINOGEN 0.2 03/10/2024 11:38 PM    BILIRUBINUR Negative 03/10/2024 11:38 PM    BLOODU Negative 03/10/2024 11:38 PM    GLUCOSEU Negative 03/10/2024 11:38 PM    KETUA TRACE 03/10/2024 11:38 PM    AMORPHOUS 1+ 12/24/2023 03:13 PM     Urine Cultures: No results found for: \"LABURIN\"  Blood Cultures: No results found for: \"BC\"  No results found for: \"BLOODCULT2\"  Organism: No results found for: \"ORG\"    Time Spent Discharging patient 55 minutes    Electronically signed by SHELDON Cooper CNP on 3/11/2024 at 3:15 PM

## 2024-03-19 PROCEDURE — 93298 REM INTERROG DEV EVAL SCRMS: CPT | Performed by: INTERNAL MEDICINE

## 2024-04-19 NOTE — PLAN OF CARE
Problem: Discharge Planning  Goal: Discharge to home or other facility with appropriate resources  Outcome: Progressing  Discharge to home or other facility with appropriate resources: Identify barriers to discharge with patient and caregiver     Problem: Safety - Adult  Goal: Free from fall injury  Outcome: Progressing     Problem: Skin/Tissue Integrity  Goal: Absence of new skin breakdown  Description: 1.  Monitor for areas of redness and/or skin breakdown  2.  Assess vascular access sites hourly  3.  Every 4-6 hours minimum:  Change oxygen saturation probe site  4.  Every 4-6 hours:  If on nasal continuous positive airway pressure, respiratory therapy assess nares and determine need for appliance change or resting period.  Outcome: Progressing     Problem: ABCDS Injury Assessment  Goal: Absence of physical injury  Outcome: Progressing     Problem: Nutrition Deficit:  Goal: Optimize nutritional status  Outcome: Progressing     Problem: Pain  Goal: Verbalizes/displays adequate comfort level or baseline comfort level   Verbalizes/displays adequate comfort level or baseline comfort level: Encourage patient to monitor pain and request assistance      FAMILY HISTORY:  FH: breast cancer  FH: prostate cancer

## 2024-05-15 PROCEDURE — 93298 REM INTERROG DEV EVAL SCRMS: CPT | Performed by: INTERNAL MEDICINE

## 2024-06-21 PROCEDURE — 93298 REM INTERROG DEV EVAL SCRMS: CPT | Performed by: INTERNAL MEDICINE

## 2024-06-27 ENCOUNTER — OFFICE VISIT (OUTPATIENT)
Dept: CARDIOLOGY CLINIC | Age: 79
End: 2024-06-27
Payer: MEDICARE

## 2024-06-27 VITALS
HEART RATE: 74 BPM | DIASTOLIC BLOOD PRESSURE: 64 MMHG | BODY MASS INDEX: 19.94 KG/M2 | SYSTOLIC BLOOD PRESSURE: 122 MMHG | WEIGHT: 119.8 LBS

## 2024-06-27 DIAGNOSIS — I49.3 PVC'S (PREMATURE VENTRICULAR CONTRACTIONS): ICD-10-CM

## 2024-06-27 DIAGNOSIS — Z95.818 IMPLANTABLE LOOP RECORDER PRESENT: ICD-10-CM

## 2024-06-27 DIAGNOSIS — Z79.01 ON CONTINUOUS ORAL ANTICOAGULATION: ICD-10-CM

## 2024-06-27 DIAGNOSIS — I63.9 CEREBROVASCULAR ACCIDENT (CVA), UNSPECIFIED MECHANISM (HCC): ICD-10-CM

## 2024-06-27 DIAGNOSIS — I48.0 PAROXYSMAL ATRIAL FIBRILLATION (HCC): Primary | ICD-10-CM

## 2024-06-27 PROCEDURE — 1123F ACP DISCUSS/DSCN MKR DOCD: CPT | Performed by: NURSE PRACTITIONER

## 2024-06-27 PROCEDURE — 93000 ELECTROCARDIOGRAM COMPLETE: CPT | Performed by: NURSE PRACTITIONER

## 2024-06-27 PROCEDURE — 1036F TOBACCO NON-USER: CPT | Performed by: NURSE PRACTITIONER

## 2024-06-27 PROCEDURE — G8427 DOCREV CUR MEDS BY ELIG CLIN: HCPCS | Performed by: NURSE PRACTITIONER

## 2024-06-27 PROCEDURE — 99215 OFFICE O/P EST HI 40 MIN: CPT | Performed by: NURSE PRACTITIONER

## 2024-06-27 PROCEDURE — G8400 PT W/DXA NO RESULTS DOC: HCPCS | Performed by: NURSE PRACTITIONER

## 2024-06-27 PROCEDURE — 1090F PRES/ABSN URINE INCON ASSESS: CPT | Performed by: NURSE PRACTITIONER

## 2024-06-27 PROCEDURE — G8420 CALC BMI NORM PARAMETERS: HCPCS | Performed by: NURSE PRACTITIONER

## 2024-06-27 RX ORDER — PRAVASTATIN SODIUM 80 MG/1
80 TABLET ORAL DAILY
COMMUNITY
Start: 2023-01-17

## 2024-06-27 RX ORDER — SPIRONOLACTONE 25 MG/1
1 TABLET ORAL DAILY
COMMUNITY
Start: 2020-12-17

## 2024-06-27 RX ORDER — FUROSEMIDE 20 MG/1
20 TABLET ORAL DAILY
COMMUNITY
Start: 2024-05-30

## 2024-06-27 RX ORDER — MULTIVITAMIN
1 TABLET ORAL DAILY
COMMUNITY
Start: 2024-05-15

## 2024-06-27 NOTE — PROGRESS NOTES
bleeding - continue  - On carvedilol 25 mg twice daily  - Reviewed risk factors, pathophysiology, treatment options and lifestyle modification for atrial fibrillation: Blood pressure control, blood sugar control, healthy diet, minimal alcohol intake, no smoking, activity and exercise, manage stress sleep apnea evaluation and symptoms of a stroke.  - Reviewed recent labs  - F/u with Dr. Sutton in 6 months    [facility] CVA  - S/p ILR  - Device check today shows no patient or device activated events.  - ECG ordered and results personally reviewed     NICMP  - EF 55-60%  - EF as low as 30-35% (2016)  - On losartan 25 mg daily, carvedilol 25 mg twice a day, spironolactone 25 mg QD  - On furosemide 20 mg daily  - Last K+ 3.1  - Will recheck BMP, magnesium    HTN  - BP controlled in the office today  - On losartan 25 mg daily, carvedilol 25 mg twice daily    All questions and concerns were addressed to the patient/family. Alternatives to my treatment were discussed. The note was completed using EMR. Every effort was made to ensure accuracy; however, inadvertent computerized transcription errors may be present.     Patient received education regarding their diagnosis, treatment and medications while in the office today.    Giles Arenas [facility]     I  have spent 40  minutes in care of the patient including direct face to face time, chart preparation, reviewing diagnostic testing, other provider notes and coordinating patient care.

## 2024-08-27 PROCEDURE — 93298 REM INTERROG DEV EVAL SCRMS: CPT | Performed by: INTERNAL MEDICINE

## 2024-11-21 ENCOUNTER — APPOINTMENT (OUTPATIENT)
Dept: GENERAL RADIOLOGY | Age: 79
End: 2024-11-21
Payer: MEDICARE

## 2024-11-21 ENCOUNTER — HOSPITAL ENCOUNTER (EMERGENCY)
Age: 79
Discharge: HOME | End: 2024-11-21
Attending: EMERGENCY MEDICINE
Payer: MEDICARE

## 2024-11-21 VITALS
OXYGEN SATURATION: 100 % | BODY MASS INDEX: 26.66 KG/M2 | HEART RATE: 68 BPM | HEIGHT: 65 IN | DIASTOLIC BLOOD PRESSURE: 113 MMHG | TEMPERATURE: 98.1 F | SYSTOLIC BLOOD PRESSURE: 134 MMHG | RESPIRATION RATE: 26 BRPM | WEIGHT: 160 LBS

## 2024-11-21 DIAGNOSIS — M17.12 PRIMARY OSTEOARTHRITIS OF LEFT KNEE: Primary | ICD-10-CM

## 2024-11-21 PROCEDURE — 6370000000 HC RX 637 (ALT 250 FOR IP)

## 2024-11-21 PROCEDURE — 73562 X-RAY EXAM OF KNEE 3: CPT

## 2024-11-21 PROCEDURE — 73560 X-RAY EXAM OF KNEE 1 OR 2: CPT

## 2024-11-21 PROCEDURE — 99283 EMERGENCY DEPT VISIT LOW MDM: CPT

## 2024-11-21 RX ORDER — OXYCODONE HYDROCHLORIDE 5 MG/1
2.5 TABLET ORAL ONCE
Status: COMPLETED | OUTPATIENT
Start: 2024-11-21 | End: 2024-11-21

## 2024-11-21 RX ORDER — ACETAMINOPHEN 500 MG
1000 TABLET ORAL ONCE
Status: COMPLETED | OUTPATIENT
Start: 2024-11-21 | End: 2024-11-21

## 2024-11-21 RX ADMIN — OXYCODONE 2.5 MG: 5 TABLET ORAL at 15:43

## 2024-11-21 RX ADMIN — ACETAMINOPHEN 1000 MG: 500 TABLET, FILM COATED ORAL at 15:43

## 2024-11-21 ASSESSMENT — PAIN SCALES - GENERAL: PAINLEVEL_OUTOF10: 10

## 2024-11-21 ASSESSMENT — ENCOUNTER SYMPTOMS
ABDOMINAL PAIN: 0
NAUSEA: 0
VOMITING: 0
COUGH: 0
DIARRHEA: 0
SHORTNESS OF BREATH: 0
CHEST TIGHTNESS: 0
CONSTIPATION: 0

## 2024-11-21 ASSESSMENT — LIFESTYLE VARIABLES
HOW OFTEN DO YOU HAVE A DRINK CONTAINING ALCOHOL: NEVER
HOW MANY STANDARD DRINKS CONTAINING ALCOHOL DO YOU HAVE ON A TYPICAL DAY: PATIENT DOES NOT DRINK

## 2024-11-21 ASSESSMENT — PAIN DESCRIPTION - ORIENTATION: ORIENTATION: LEFT

## 2024-11-21 ASSESSMENT — PAIN DESCRIPTION - LOCATION: LOCATION: KNEE

## 2024-11-21 NOTE — ED PROVIDER NOTES
THE [facility]  EMERGENCY DEPARTMENT ENCOUNTER          TJ RESIDENT NOTE       Date of evaluation: 11/21/2024    Chief Complaint     Knee Pain (Pt presents to the ED due to left knee pain. Pt typically gets steroids shots but has not received any recently. )    History of Present Illness     Halina Rae is a 79 y.o. female with a past medical history significant for osteoarthritis of bilateral knees who presents to the ED for left knee pain. She reports the pain started suddenly last night. She denies any trauma or fall. Pain is described as sharp and 10/10. Pain is worse when she tries to lift her leg or bend her knee. She reports the pain does lessen if she doesn't move but it is still present. She denies trying to stand up or walk since pain started. She also denies taking anything for pain. Patient also denies any fever, chills, chest pain, shortness of breath, nausea, vomiting, diarrhea, or urinary symptoms. Family reports that she had a steroid injection 3 weeks ago.    ASSESSMENT / PLAN  (MEDICAL DECISION MAKING)     INITIAL VITALS: BP: (!) 152/88, Temp: 98.1 °F (36.7 °C), Pulse: 70, Respirations: 18, SpO2: 100 %     Halina Rae is a 79 y.o. female with osteoarthritis of bilateral knees presents for left knee pain. Vital signs stable. Physical exam shows an uncomfortable female with tenderness of her left knee. No ligament laxity noted. Range of motion limited due to pain with crepitus upon motion of the knee. Mild swelling noted in left knee but no erythema. Xray shows tricompartmental osteoarthritis, a small suprapatellar bursal effusion, and no evidence of acute fracture. Pain suspected to be due to severe osteoarthritis. Per chart review, patient has been seen for this issue as recently as October and it is documented that she has been prescribed Voltaren gel in the past but reported that she ran out last month. Tylenol 1,000 mg and oxycodone 2.5 mg given for pain. Patient reports that her

## 2024-11-21 NOTE — DISCHARGE INSTRUCTIONS
Please follow-up with your primary care physician    A referral has been sent to Dr. Walden, orthopedic surgery    Please take Tylenol every 4-6 hours as needed for pain

## 2024-11-21 NOTE — ED PROVIDER NOTES
ED Attending Attestation Note     Date of evaluation: 11/21/2024    This patient was seen by the resident.  I have seen and examined the patient, agree with the workup, evaluation, management and diagnosis. The care plan has been discussed.      Briefly, Halina Rae is a 79 y.o. female with a PMH inclusive of CVA, arthritis who presents for evaluation of knee pain. Started last night. No trauma or injuries. 1/010. Worse with any movement. No fevers. Most recent injection three weeks ago.     Notable exam findings include question of minimal swelling of left knee with compared to right. No erythema, asymmetric warmth. Pain and crepitus with ROM. No significant pain with axial load.     Assessment/ Medical Decision Making:     X-ray with only mildy suprapatellar bursal effusion and severe arthritis. Symptomatically improved following PO medication here. Exam reassuring.  I have considered but have low clinical suspicion for septic arthritis, hemarthrosis, or other dangerous condition.  If she is able to ambulate, do think she will be appropriate for discharge with outpatient follow-up with her pain specialist.     Deejay Coburn MD  11/22/24 [id number]

## 2024-11-21 NOTE — ED NOTES
Pt ambulated with a walker independently. States that knee pain has improved since arrival here.      Josh Burns RN  11/21/24 [id number]

## 2025-01-12 ENCOUNTER — HOSPITAL ENCOUNTER (EMERGENCY)
Age: 80
Discharge: HOME | End: 2025-01-13
Attending: STUDENT IN AN ORGANIZED HEALTH CARE EDUCATION/TRAINING PROGRAM
Payer: MEDICARE

## 2025-01-12 ENCOUNTER — APPOINTMENT (OUTPATIENT)
Dept: GENERAL RADIOLOGY | Age: 80
End: 2025-01-12
Payer: MEDICARE

## 2025-01-12 VITALS
HEART RATE: 67 BPM | SYSTOLIC BLOOD PRESSURE: 143 MMHG | OXYGEN SATURATION: 100 % | DIASTOLIC BLOOD PRESSURE: 57 MMHG | TEMPERATURE: 98.1 F | RESPIRATION RATE: 20 BRPM

## 2025-01-12 DIAGNOSIS — R07.9 CHEST PAIN, UNSPECIFIED TYPE: Primary | ICD-10-CM

## 2025-01-12 DIAGNOSIS — R52 BODY ACHES: ICD-10-CM

## 2025-01-12 LAB
ANION GAP SERPL CALCULATED.3IONS-SCNC: 9 MMOL/L (ref 3–16)
BASOPHILS # BLD: 0 K/UL (ref 0–0.2)
BASOPHILS NFR BLD: 0.5 %
BUN SERPL-MCNC: 10 MG/DL (ref 7–20)
CALCIUM SERPL-MCNC: 9.4 MG/DL (ref 8.3–10.6)
CHLORIDE SERPL-SCNC: 104 MMOL/L (ref 99–110)
CO2 SERPL-SCNC: 26 MMOL/L (ref 21–32)
CREAT SERPL-MCNC: 0.7 MG/DL (ref 0.6–1.2)
DEPRECATED RDW RBC AUTO: 14.8 % (ref 12.4–15.4)
EOSINOPHIL # BLD: 0.2 K/UL (ref 0–0.6)
EOSINOPHIL NFR BLD: 1.9 %
FLUAV RNA RESP QL NAA+PROBE: NOT DETECTED
FLUBV RNA RESP QL NAA+PROBE: NOT DETECTED
GFR SERPLBLD CREATININE-BSD FMLA CKD-EPI: 88 ML/MIN/{1.73_M2}
GLUCOSE SERPL-MCNC: 102 MG/DL (ref 70–99)
HCT VFR BLD AUTO: 38.2 % (ref 36–48)
HGB BLD-MCNC: 12.9 G/DL (ref 12–16)
LYMPHOCYTES # BLD: 1.4 K/UL (ref 1–5.1)
LYMPHOCYTES NFR BLD: 15.8 %
MCH RBC QN AUTO: 29.6 PG (ref 26–34)
MCHC RBC AUTO-ENTMCNC: 33.7 G/DL (ref 31–36)
MCV RBC AUTO: 87.7 FL (ref 80–100)
MONOCYTES # BLD: 0.7 K/UL (ref 0–1.3)
MONOCYTES NFR BLD: 8.3 %
NEUTROPHILS # BLD: 6.4 K/UL (ref 1.7–7.7)
NEUTROPHILS NFR BLD: 73.5 %
PLATELET # BLD AUTO: 121 K/UL (ref 135–450)
PMV BLD AUTO: 9.5 FL (ref 5–10.5)
POTASSIUM SERPL-SCNC: 3.6 MMOL/L (ref 3.5–5.1)
POTASSIUM SERPL-SCNC: 3.7 MMOL/L (ref 3.5–5.1)
POTASSIUM SERPL-SCNC: ABNORMAL MMOL/L (ref 3.5–5.1)
RBC # BLD AUTO: 4.36 M/UL (ref 4–5.2)
SARS-COV-2 RNA RESP QL NAA+PROBE: NOT DETECTED
SODIUM SERPL-SCNC: 139 MMOL/L (ref 136–145)
TROPONIN, HIGH SENSITIVITY: 70 NG/L (ref 0–14)
TROPONIN, HIGH SENSITIVITY: 72 NG/L (ref 0–14)
WBC # BLD AUTO: 8.7 K/UL (ref 4–11)

## 2025-01-12 PROCEDURE — 84132 ASSAY OF SERUM POTASSIUM: CPT

## 2025-01-12 PROCEDURE — 99285 EMERGENCY DEPT VISIT HI MDM: CPT

## 2025-01-12 PROCEDURE — 93005 ELECTROCARDIOGRAM TRACING: CPT | Performed by: STUDENT IN AN ORGANIZED HEALTH CARE EDUCATION/TRAINING PROGRAM

## 2025-01-12 PROCEDURE — 71046 X-RAY EXAM CHEST 2 VIEWS: CPT

## 2025-01-12 PROCEDURE — 87636 SARSCOV2 & INF A&B AMP PRB: CPT

## 2025-01-12 PROCEDURE — 80048 BASIC METABOLIC PNL TOTAL CA: CPT

## 2025-01-12 PROCEDURE — 84484 ASSAY OF TROPONIN QUANT: CPT

## 2025-01-12 PROCEDURE — 85025 COMPLETE CBC W/AUTO DIFF WBC: CPT

## 2025-01-13 LAB
EKG ATRIAL RATE: 62 BPM
EKG DIAGNOSIS: NORMAL
EKG P AXIS: 45 DEGREES
EKG P-R INTERVAL: 136 MS
EKG Q-T INTERVAL: 402 MS
EKG QRS DURATION: 86 MS
EKG QTC CALCULATION (BAZETT): 408 MS
EKG R AXIS: 2 DEGREES
EKG T AXIS: -10 DEGREES
EKG VENTRICULAR RATE: 62 BPM
POTASSIUM SERPL-SCNC: 3.4 MMOL/L (ref 3.5–5.1)

## 2025-01-13 PROCEDURE — 84132 ASSAY OF SERUM POTASSIUM: CPT

## 2025-01-13 PROCEDURE — 36415 COLL VENOUS BLD VENIPUNCTURE: CPT

## 2025-01-13 NOTE — ED PROVIDER NOTES
[facility]  EMERGENCY DEPARTMENT ENCOUNTER          ATTENDING PHYSICIAN NOTE       Date of evaluation: 1/12/2025    Chief Complaint     Chest Pain      History of Present Illness     Halina Rae is a 79 y.o. female who presents with past medical history of hypertension, hyperlipidemia, depression, CVA, CHF presenting with intermittent chest pain and bodyaches.  The patient states she has been having bodyaches for the last few days but presently does not have any pain.  The patient's daughter is here with her and states that the patient was complaining of sharp chest pain just prior to arrival.  The patient endorses on and off body aches for the last few days.    ASSESSMENT / PLAN  (MEDICAL DECISION MAKING)     INITIAL VITALS: BP: (!) 147/77, Temp: 98.1 °F (36.7 °C), Pulse: 64, Respirations: 18, SpO2: 99 %      Halina Rae is a 79 y.o. female who presents with past medical history of hypertension, hyperlipidemia, depression, CVA, CHF presenting with intermittent chest pain and bodyaches.    Workup today is reassuring with troponin of 72 and 70 and EKG that is nonischemic.  Her troponin is elevated however her baseline appears to be in the high 50s.  Low suspicion for ACS given the patient is pain-free at this time with her stable cardiac enzymes.  BMP does not reveal any gross electrolyte abnormality or renal dysfunction.  Patient's potassium was hemolyzed multiple times, prolonging her ED stay until repeat potassium was not hemolyzed.  COVID and flu swabs are both negative.  CBC is without leukocytosis or anemia.  Chest x-ray does not reveal any evidence of pneumothorax or pneumonia or other intrathoracic abnormality on my independent evaluation.    On reassessment, patient remains symptom-free.  Given she is symptom-free and has remained hemodynamically stable, feel she is safe for discharge home and follow-up with her PCP.  Suspect she has some degree of viral illness causing her generalized

## 2025-01-13 NOTE — ED TRIAGE NOTES
Pt arrives in ED c/o \"not feeling well for the past couple of days\". Pt denies CP/SOB, ABD pain, dizziness. Pt poor historian.

## 2025-01-16 PROCEDURE — 93298 REM INTERROG DEV EVAL SCRMS: CPT | Performed by: INTERNAL MEDICINE

## 2025-02-05 ENCOUNTER — TELEPHONE (OUTPATIENT)
Dept: CARDIOLOGY CLINIC | Age: 80
End: 2025-02-05

## 2025-02-05 NOTE — TELEPHONE ENCOUNTER
Pt n/s her 6 month OV w/UL on 1.23.2025 and needs to be r/s.  We also received a recent disconnect notice via [employer] site that her bedside has not been able to communicate/connect to her ILR.   Pt may contact [employer] at [phone] for troubleshooting advice.    Thanks.